# Patient Record
Sex: MALE | Race: WHITE | NOT HISPANIC OR LATINO | Employment: FULL TIME | URBAN - METROPOLITAN AREA
[De-identification: names, ages, dates, MRNs, and addresses within clinical notes are randomized per-mention and may not be internally consistent; named-entity substitution may affect disease eponyms.]

---

## 2017-02-03 RX ORDER — METOPROLOL SUCCINATE 25 MG
TABLET, EXTENDED RELEASE 24 HR ORAL
Qty: 90 TABLET | Refills: 0 | Status: SHIPPED | OUTPATIENT
Start: 2017-02-03 | End: 2017-05-02 | Stop reason: SDUPTHER

## 2017-02-03 RX ORDER — OLMESARTAN/HYDROCHLOROTHIAZIDE 40-12.5 MG
TABLET ORAL
Qty: 90 TABLET | Refills: 0 | Status: SHIPPED | OUTPATIENT
Start: 2017-02-03 | End: 2017-05-02 | Stop reason: SDUPTHER

## 2017-02-03 RX ORDER — ATORVASTATIN CALCIUM 80 MG/1
TABLET, FILM COATED ORAL
Qty: 90 TABLET | Refills: 0 | Status: SHIPPED | OUTPATIENT
Start: 2017-02-03 | End: 2017-03-23

## 2017-02-22 DIAGNOSIS — Z11.59 NEED FOR HEPATITIS C SCREENING TEST: Primary | ICD-10-CM

## 2017-02-24 LAB — HCV AB S/CO SERPL IA: <0.1 S/CO RATIO (ref 0–0.9)

## 2017-02-25 LAB
25(OH)D3+25(OH)D2 SERPL-MCNC: 29 NG/ML (ref 30–100)
ALBUMIN SERPL-MCNC: 4 G/DL (ref 3.5–5.2)
ALBUMIN/GLOB SERPL: 1.5 G/DL
ALP SERPL-CCNC: 53 U/L (ref 39–117)
ALT SERPL-CCNC: 23 U/L (ref 1–41)
APPEARANCE UR: CLEAR
AST SERPL-CCNC: 25 U/L (ref 1–40)
BILIRUB SERPL-MCNC: 0.5 MG/DL (ref 0.1–1.2)
BILIRUB UR QL STRIP: NEGATIVE
BUN SERPL-MCNC: 18 MG/DL (ref 8–23)
BUN/CREAT SERPL: 18.8 (ref 7–25)
CALCIUM SERPL-MCNC: 9.7 MG/DL (ref 8.6–10.5)
CHLORIDE SERPL-SCNC: 101 MMOL/L (ref 98–107)
CHOLEST SERPL-MCNC: 121 MG/DL (ref 100–199)
CK SERPL-CCNC: 137 U/L (ref 20–200)
CO2 SERPL-SCNC: 26.2 MMOL/L (ref 22–29)
COLOR UR: YELLOW
CREAT SERPL-MCNC: 0.96 MG/DL (ref 0.76–1.27)
ERYTHROCYTE [DISTWIDTH] IN BLOOD BY AUTOMATED COUNT: 14 % (ref 11.5–14.5)
GLOBULIN SER CALC-MCNC: 2.6 GM/DL
GLUCOSE SERPL-MCNC: 112 MG/DL (ref 65–99)
GLUCOSE UR QL: NEGATIVE
HBA1C MFR BLD: 5.4 % (ref 4.8–5.6)
HCT VFR BLD AUTO: 41.1 % (ref 40.4–52.2)
HDL SERPL-SCNC: 38.4 UMOL/L
HDLC SERPL-MCNC: 60 MG/DL
HGB BLD-MCNC: 14 G/DL (ref 13.7–17.6)
HGB UR QL STRIP: NEGATIVE
KETONES UR QL STRIP: NEGATIVE
LDL SERPL QN: 20.5 NM
LDL SERPL-SCNC: 632 NMOL/L
LDL SMALL SERPL-SCNC: 347 NMOL/L
LDLC SERPL CALC-MCNC: 51 MG/DL (ref 0–99)
LEUKOCYTE ESTERASE UR QL STRIP: NEGATIVE
MCH RBC QN AUTO: 30.6 PG (ref 27–32.7)
MCHC RBC AUTO-ENTMCNC: 34.1 G/DL (ref 32.6–36.4)
MCV RBC AUTO: 89.9 FL (ref 79.8–96.2)
NITRITE UR QL STRIP: NEGATIVE
PH UR STRIP: 6.5 [PH] (ref 5–8)
PLATELET # BLD AUTO: 206 10*3/MM3 (ref 140–500)
POTASSIUM SERPL-SCNC: 4.7 MMOL/L (ref 3.5–5.2)
PROT SERPL-MCNC: 6.6 G/DL (ref 6–8.5)
PROT UR QL STRIP: NEGATIVE
PSA SERPL-MCNC: 1.34 NG/ML (ref 0–4)
RBC # BLD AUTO: 4.57 10*6/MM3 (ref 4.6–6)
SODIUM SERPL-SCNC: 141 MMOL/L (ref 136–145)
SP GR UR: 1.02 (ref 1–1.03)
TRIGL SERPL-MCNC: 52 MG/DL (ref 0–149)
TSH SERPL DL<=0.005 MIU/L-ACNC: 2.97 UIU/ML (ref 0.45–4.5)
UROBILINOGEN UR STRIP-MCNC: NORMAL MG/DL
WBC # BLD AUTO: 5.68 10*3/MM3 (ref 4.5–10.7)

## 2017-03-02 ENCOUNTER — OFFICE VISIT (OUTPATIENT)
Dept: INTERNAL MEDICINE | Facility: CLINIC | Age: 62
End: 2017-03-02

## 2017-03-02 VITALS
OXYGEN SATURATION: 98 % | DIASTOLIC BLOOD PRESSURE: 84 MMHG | SYSTOLIC BLOOD PRESSURE: 124 MMHG | HEIGHT: 71 IN | WEIGHT: 192 LBS | HEART RATE: 68 BPM | BODY MASS INDEX: 26.88 KG/M2

## 2017-03-02 DIAGNOSIS — E55.9 VITAMIN D DEFICIENCY: Chronic | ICD-10-CM

## 2017-03-02 DIAGNOSIS — Z23 NEED FOR PNEUMOCOCCAL VACCINATION: ICD-10-CM

## 2017-03-02 DIAGNOSIS — R73.01 IMPAIRED FASTING GLUCOSE: Chronic | ICD-10-CM

## 2017-03-02 DIAGNOSIS — E78.5 HYPERLIPIDEMIA, UNSPECIFIED HYPERLIPIDEMIA TYPE: Chronic | ICD-10-CM

## 2017-03-02 DIAGNOSIS — Z23 NEED FOR TDAP VACCINATION: ICD-10-CM

## 2017-03-02 DIAGNOSIS — Z80.0 FAMILY HISTORY OF COLON CANCER: Chronic | ICD-10-CM

## 2017-03-02 DIAGNOSIS — Z00.00 ROUTINE PHYSICAL EXAMINATION: Primary | ICD-10-CM

## 2017-03-02 DIAGNOSIS — I10 BENIGN ESSENTIAL HYPERTENSION: Chronic | ICD-10-CM

## 2017-03-02 PROBLEM — Z92.29 HISTORY OF PNEUMOCOCCAL VACCINATION: Status: RESOLVED | Noted: 2017-03-02 | Resolved: 2017-03-02

## 2017-03-02 PROBLEM — Z92.29 HISTORY OF TETANUS, DIPHTHERIA, AND ACELLULAR PERTUSSIS BOOSTER VACCINATION (TDAP): Status: ACTIVE | Noted: 2017-03-02

## 2017-03-02 PROBLEM — Z92.29 HISTORY OF TETANUS, DIPHTHERIA, AND ACELLULAR PERTUSSIS BOOSTER VACCINATION (TDAP): Status: RESOLVED | Noted: 2017-03-02 | Resolved: 2017-03-02

## 2017-03-02 PROBLEM — Z92.29 HISTORY OF PNEUMOCOCCAL VACCINATION: Status: ACTIVE | Noted: 2017-03-02

## 2017-03-02 PROCEDURE — 99396 PREV VISIT EST AGE 40-64: CPT | Performed by: INTERNAL MEDICINE

## 2017-03-02 PROCEDURE — 90471 IMMUNIZATION ADMIN: CPT | Performed by: INTERNAL MEDICINE

## 2017-03-02 PROCEDURE — 90732 PPSV23 VACC 2 YRS+ SUBQ/IM: CPT | Performed by: INTERNAL MEDICINE

## 2017-03-02 PROCEDURE — 90472 IMMUNIZATION ADMIN EACH ADD: CPT | Performed by: INTERNAL MEDICINE

## 2017-03-02 PROCEDURE — 90715 TDAP VACCINE 7 YRS/> IM: CPT | Performed by: INTERNAL MEDICINE

## 2017-03-02 NOTE — PROGRESS NOTES
03/02/2017    Patient Information  George Chavez                                                                                          [address] [facility] [address]      1955  [phone]      Chief Complaint:     Routine physical examination and follow-up lab work.  No new acute complaints.    History of Present Illness:    Patient with history of hypertension, scoliosis, hyperlipidemia, impaired fasting glucose, family history of colon cancer.  He presents today for a routine annual exam and follow-up lab work.  His past medical history extensively reviewed and updated where necessary including his health maintenance parameters.  This reveals he is up-to-date on his colonoscopy.  He had a colonoscopy last summer that was totally normal.  Although he has a family history of colon cancer this is in his maternal grandfather at the age of 86.  The endoscopist was felt that repeat colonoscopy would be indicated for 10 years.  Review of the records also reveals he needs TDaP, his first pneumococcal vaccination, and Zostavax.  We will give him the pneumococcal vaccination and TDaP today.  He can stop by sometime later this year and received the shingles vaccination.    Review of Systems   Constitution: Negative.   HENT: Negative.    Eyes: Negative.    Cardiovascular: Negative.    Respiratory: Negative.    Endocrine: Negative.    Hematologic/Lymphatic: Negative.    Skin: Negative.    Musculoskeletal: Negative.    Gastrointestinal: Negative.    Genitourinary: Negative.    Neurological: Negative.    Psychiatric/Behavioral: Negative.    Allergic/Immunologic: Negative.        Active Problems:    Patient Active Problem List   Diagnosis   • Benign essential hypertension   • Lumbar scoliosis   • Hyperlipidemia   • Impaired fasting glucose   • Vitamin D deficiency   • Therapeutic drug monitoring   • Routine physical examination   • Family history of colon cancer         Past Medical History    Diagnosis Date   • History of Adhesive capsulitis of right shoulder 05/12/2010     MRI of the right shoulder 05/12/2010 revealed labral tears and evidence of adhesive capsulitis. Patient was evaluated by orthopedist 05/18/2010 and was given a steroid injection and referred to physical therapy. This resulted in improvement of symptoms.   • History of CT scan of abdomen 08/31/2011 08/31/2011 CT of the abdomen and pelvis performed for groin pain was negative.   • History of Holter monitoring 07/12/2010 07/12/2010 ventricular ectopy was 1122, no runs of ventricular tachycardia for ventricular bigeminy. There were 8 events of ventricular trigeminy. Supraventricular ectopy was 51 with no runs or bigeminy.   • History of Impacted cerumen of both ears 02/23/2015 02/23/2015--bilateral cerumen impaction removed with irrigation and curettage. TMs and ear canals are normal. 01/14/2014--bilateral cerumen impaction removed with irrigation and curettage.   • History of MRI Lumbar spine pain 09/23/2011 09/23/2011--MRI lumbar spine revealed mild to moderate degenerative arthritis, dextro scoliosis with the apex centered around L3 and L4   • History of Palpitations 07/12/2010 07/12/2010--24-hour Holter monitor revealed several PVCs and fewer PACs. No significant arrhythmia. Stress echocardiogram 06/06/2010 normal.   • History of Right inguinal pain 09/23/2011     Patient has intermittent right sided groin pain. Urologic evaluation 09/06/2011 was negative. It is suspected that this is referred pain. Possibly from the back, however MRI lumbar spine 09/23/2011 was essentially negative except for some mild to moderate degenerative arthritis and dextro scoliosis.   • History of stress echo 06/06/2002 06/06/2002--normal stress echo         Past Surgical History   Procedure Laterality Date   • Colonoscopy  04/11/2011 04/11/2011--normal colonoscopy.    • Colonoscopy  12/10/2005     12/10/2005--normal  "colonoscopy.   • Laparoscopic cholecystectomy  08/22/2011 08/22/2011--laparoscopic cholecystectomy   • Colonoscopy N/A 7/13/2016 07/13/2016--normal colonoscopy to the cecum with a good prep.         No Known Allergies        Current Outpatient Prescriptions:   •  aspirin 81 MG EC tablet, Take by mouth daily., Disp: , Rfl:   •  atorvastatin (LIPITOR) 80 MG tablet, TAKE 1 TABLET DAILY, Disp: 90 tablet, Rfl: 0  •  BENICAR HCT 40-12.5 MG per tablet, TAKE 1 TABLET DAILY, Disp: 90 tablet, Rfl: 0  •  Cholecalciferol (VITAMIN D-3) 1000 UNITS capsule, Take by mouth., Disp: , Rfl:   •  Coenzyme Q10 300 MG capsule, Take by mouth., Disp: , Rfl:   •  Multiple Vitamin (MULTIVITAMINS PO), Take by mouth daily., Disp: , Rfl:   •  TOPROL XL 25 MG 24 hr tablet, TAKE 1 TABLET DAILY, Disp: 90 tablet, Rfl: 0      Family History   Problem Relation Age of Onset   • Heart attack Mother      Mother had a Myocardial Infarction at age 81. Status post PTCA with stents.   • Heart attack Father      Father had a myocardial infarction at age 60.   • Cancer Maternal Grandfather      MGF was diagnosed with colon cancer at age 86         Social History     Social History   • Marital status:      Spouse name: N/A   • Number of children: N/A   • Years of education: N/A     Occupational History   • Civil       Social History Main Topics   • Smoking status: Former Smoker   • Smokeless tobacco: Never Used      Comment: Stopped smoking at age 41.   • Alcohol use Yes      Comment: Modrerate   • Drug use: No   • Sexual activity: Yes     Partners: Female     Other Topics Concern   • Not on file     Social History Narrative         Vitals:    03/02/17 1547   BP: 124/84   Pulse: 68   SpO2: 98%   Weight: 192 lb (87.1 kg)   Height: 70.5\" (179.1 cm)          Physical Exam:    General: Alert and oriented x 3, with appropriate affect; no acute distress.  HEENT: pupils equal, round, and reactive to light; extraocular movements intact; " sclera nonicteric; nasal mucosa normal; pharynx normal; tympanic membranes and ear canals normal.  Neck: without JVD, thyromegaly, bruit, or adenopathy.  Lungs: clear to auscultation in all fields.  Heart: auscultation reveals regular rate and rhythm without murmur, rub, gallop, or click.  Abdomen: is soft and nontender, without hepato-splenomegaly, mass or hernia. Normal bowel sounds; .  Urologic exam: reveals normal male genitalia without testicular mass or penile/scrotal lesion.  Digital rectal exam and Prostate: deferred.  Extremities: are without clubbing, cyanosis, or edema.  Vascular: no signs of peripheral arterial disease or venous insufficiency/varicosities.  Neurological: intact without focal deficit, including cranial and peripheral nerves.  Station and gait observed to be normal during ingress and egress from the examination area.  Sensation and deep tendon reflexes tested if clinically indicated and are normal.  Musculoskeletal: exam is normal, without signs of synovitis, significant degeneration or deformity. Skin examination: without rash or significant lesions.  Patient has what appears to be an actinic keratosis on his left forearm.  Unfortunately we are out of liquid nitrogen and I will have him come back so that we can destroy lesion.      Lab/other results:    NMR is absolutely perfect.  CMP normal except blood sugar elevated 112.  Urinalysis is normal.  CBC normal.  Hemoglobin A1c 5.4 which is normal.  PSA normal at 1.34.  Vitamin D slightly low at 29.  Thyroid cascade profile normal.  CPK normal.  Hepatitis C antibody is negative.    Assessment/Plan:     Diagnosis Plan   1. Routine physical examination     2. Impaired fasting glucose     3. Hyperlipidemia, unspecified hyperlipidemia type     4. Benign essential hypertension     5. Vitamin D deficiency     6. Family history of colon cancer         Patient presents with essentially normal annual exam except for the following issues: He has very  mild impaired fasting glucose that does not require medication.  Hyperlipidemia is under excellent control and so is his blood pressure.  Vitamin D is a little bit low.  Patient has a family history of colon cancer and had a recent negative colonoscopy.  Repeat study in 10 years.    Addendum: Patient reports he is having muscular aches and pains particularly involving his lower extremities.  He questions whether or not it is related to the generic Lipitor.  He is taking coenzyme Q10 300 mg per day.  I have recommended that he discontinue the Lipitor and we will reassess in a few weeks.      Procedures

## 2017-03-23 ENCOUNTER — OFFICE VISIT (OUTPATIENT)
Dept: INTERNAL MEDICINE | Facility: CLINIC | Age: 62
End: 2017-03-23

## 2017-03-23 VITALS
HEART RATE: 77 BPM | BODY MASS INDEX: 27.16 KG/M2 | HEIGHT: 71 IN | SYSTOLIC BLOOD PRESSURE: 104 MMHG | WEIGHT: 194 LBS | OXYGEN SATURATION: 98 % | DIASTOLIC BLOOD PRESSURE: 68 MMHG

## 2017-03-23 DIAGNOSIS — E78.5 HYPERLIPIDEMIA, UNSPECIFIED HYPERLIPIDEMIA TYPE: Primary | Chronic | ICD-10-CM

## 2017-03-23 DIAGNOSIS — Z23 NEED FOR ZOSTAVAX ADMINISTRATION: ICD-10-CM

## 2017-03-23 DIAGNOSIS — L57.0 MULTIPLE ACTINIC KERATOSES: ICD-10-CM

## 2017-03-23 DIAGNOSIS — H61.23 HEARING LOSS OF BOTH EARS DUE TO CERUMEN IMPACTION: ICD-10-CM

## 2017-03-23 DIAGNOSIS — M79.10 MYALGIA: ICD-10-CM

## 2017-03-23 DIAGNOSIS — H61.23 IMPACTED CERUMEN OF BOTH EARS: ICD-10-CM

## 2017-03-23 PROCEDURE — 90471 IMMUNIZATION ADMIN: CPT | Performed by: INTERNAL MEDICINE

## 2017-03-23 PROCEDURE — 90736 HZV VACCINE LIVE SUBQ: CPT | Performed by: INTERNAL MEDICINE

## 2017-03-23 PROCEDURE — 99214 OFFICE O/P EST MOD 30 MIN: CPT | Performed by: INTERNAL MEDICINE

## 2017-03-23 PROCEDURE — 69210 REMOVE IMPACTED EAR WAX UNI: CPT | Performed by: INTERNAL MEDICINE

## 2017-03-23 PROCEDURE — 17000 DESTRUCT PREMALG LESION: CPT | Performed by: INTERNAL MEDICINE

## 2017-03-23 PROCEDURE — 17003 DESTRUCT PREMALG LES 2-14: CPT | Performed by: INTERNAL MEDICINE

## 2017-03-23 RX ORDER — ROSUVASTATIN CALCIUM 10 MG/1
TABLET, COATED ORAL
Qty: 30 TABLET | Refills: 1 | Status: SHIPPED | OUTPATIENT
Start: 2017-03-23 | End: 2017-05-04

## 2017-03-23 NOTE — PROGRESS NOTES
03/23/2017    Patient Information  George Chavez                                                                                          [address] [facility] [address]      1955  [phone]      Chief Complaint:     Complaining of skin lesions on forearms, decreased hearing and ear discomfort bilaterally, muscular aches and pains and side effects from Lipitor, need for Zostavax.    History of Present Illness:    Patient with a history of hypertension, hyperlipidemia, impaired fasting glucose, family history of colon cancer, recent myalgias believed to be related to Lipitor.  He presents today for several issues that will be described below.  His past medical history extensively reviewed including health maintenance parameters and this reveals he needs Zostavax which we will give today.    The history regarding myalgia secondary to Lipitor:    03/23/2017--patient seen in follow-up and reports his myalgias have essentially resolved after discontinuation of Lipitor.  We will try generic Crestor 10 mg per day.  Reassess with lab in about 6 weeks.    03/02/2017--patient reports muscular aches and pains, particularly lower extremities possibly related to Lipitor, despite taking coenzyme Q 10 300 mg per day.  Lipitor discontinued.    The history regarding forearm skin lesions:    03/23/2017--1 actinic keratosis on the left forearm, 2 actinic keratoses on the right forearm, were destroyed with liquid nitrogen today.      The history regarding hearing loss:    03/23/2017--patient complaining of decreased hearing and discomfort in his ears bilaterally.  Examination reveals bilateral cerumen impactions.  These were removed with irrigation and curettage.    Review of Systems   Constitution: Negative.   HENT: Positive for ear pain and hearing loss.    Eyes: Negative.    Cardiovascular: Negative.    Respiratory: Negative.    Endocrine: Negative.    Hematologic/Lymphatic: Negative.    Skin:  Positive for suspicious lesions.   Musculoskeletal: Positive for muscle cramps and myalgias.   Gastrointestinal: Negative.    Genitourinary: Negative.    Neurological: Negative.    Psychiatric/Behavioral: Negative.    Allergic/Immunologic: Negative.        Active Problems:    Patient Active Problem List   Diagnosis   • Benign essential hypertension   • Lumbar scoliosis   • Hyperlipidemia   • Impaired fasting glucose   • Vitamin D deficiency   • Therapeutic drug monitoring   • Routine physical examination   • Family history of colon cancer   • Myalgia, secondary Lipitor   • Multiple actinic keratoses   • Impacted cerumen of both ears   • Hearing loss of both ears due to cerumen impaction   • Need for Zostavax administration         Past Medical History:   Diagnosis Date   • History of Adhesive capsulitis of right shoulder 05/12/2010    MRI of the right shoulder 05/12/2010 revealed labral tears and evidence of adhesive capsulitis. Patient was evaluated by orthopedist 05/18/2010 and was given a steroid injection and referred to physical therapy. This resulted in improvement of symptoms.   • History of CT scan of abdomen 08/31/2011 08/31/2011 CT of the abdomen and pelvis performed for groin pain was negative.   • History of Holter monitoring 07/12/2010 07/12/2010 ventricular ectopy was 1122, no runs of ventricular tachycardia for ventricular bigeminy. There were 8 events of ventricular trigeminy. Supraventricular ectopy was 51 with no runs or bigeminy.   • History of MRI Lumbar spine pain 09/23/2011 09/23/2011--MRI lumbar spine revealed mild to moderate degenerative arthritis, dextro scoliosis with the apex centered around L3 and L4   • History of Palpitations 07/12/2010 07/12/2010--24-hour Holter monitor revealed several PVCs and fewer PACs. No significant arrhythmia. Stress echocardiogram 06/06/2010 normal.   • History of pneumococcal vaccination 3/2/2017    03/02/2017--PPSV 23 given.  Age 61.  Prevnar 13  when he turns 66.   • History of Right inguinal pain 09/23/2011    Patient has intermittent right sided groin pain. Urologic evaluation 09/06/2011 was negative. It is suspected that this is referred pain. Possibly from the back, however MRI lumbar spine 09/23/2011 was essentially negative except for some mild to moderate degenerative arthritis and dextro scoliosis.   • History of stress echo 06/06/2002 06/06/2002--normal stress echo   • History of tetanus, diphtheria, and acellular pertussis booster vaccination (Tdap) 3/2/2017    03/02/2017--TDaP given.         Past Surgical History:   Procedure Laterality Date   • COLONOSCOPY  04/11/2011 04/11/2011--normal colonoscopy.    • COLONOSCOPY  12/10/2005    12/10/2005--normal colonoscopy.   • COLONOSCOPY N/A 7/13/2016 07/13/2016--normal colonoscopy to the cecum with a good prep.   • LAPAROSCOPIC CHOLECYSTECTOMY  08/22/2011 08/22/2011--laparoscopic cholecystectomy         No Known Allergies        Current Outpatient Prescriptions:   •  aspirin 81 MG EC tablet, Take by mouth daily., Disp: , Rfl:   •  atorvastatin (LIPITOR) 80 MG tablet, TAKE 1 TABLET DAILY, Disp: 90 tablet, Rfl: 0  •  BENICAR HCT 40-12.5 MG per tablet, TAKE 1 TABLET DAILY, Disp: 90 tablet, Rfl: 0  •  Cholecalciferol (VITAMIN D-3) 1000 UNITS capsule, Take by mouth., Disp: , Rfl:   •  Coenzyme Q10 300 MG capsule, Take by mouth., Disp: , Rfl:   •  Multiple Vitamin (MULTIVITAMINS PO), Take by mouth daily., Disp: , Rfl:   •  TOPROL XL 25 MG 24 hr tablet, TAKE 1 TABLET DAILY, Disp: 90 tablet, Rfl: 0      Family History   Problem Relation Age of Onset   • Heart attack Mother      Mother had a Myocardial Infarction at age 81. Status post PTCA with stents.   • Heart attack Father      Father had a myocardial infarction at age 60.   • Cancer Maternal Grandfather      MGF was diagnosed with colon cancer at age 86         Social History     Social History   • Marital status:      Spouse name: N/A   •  "Number of children: N/A   • Years of education: N/A     Occupational History   • Civil       Social History Main Topics   • Smoking status: Former Smoker   • Smokeless tobacco: Never Used      Comment: Stopped smoking at age 41.   • Alcohol use Yes      Comment: Modrerate   • Drug use: No   • Sexual activity: Yes     Partners: Female     Other Topics Concern   • Not on file     Social History Narrative         Vitals:    03/23/17 1344   BP: 104/68   Pulse: 77   SpO2: 98%   Weight: 194 lb (88 kg)   Height: 70.5\" (179.1 cm)          Physical Exam:    General: Alert and oriented x 3.  No acute distress.  Normal affect.  HEENT: Pupils equal, round, reactive to light; extraocular movements intact; sclerae nonicteric; pharynx, ear canals and TMs normal.  Neck: Without JVD, thyromegaly, bruit, or adenopathy.  Lungs: Clear to auscultation in all fields.  Heart: Regular rate and rhythm without murmur, rub, gallop, or click.  Abdomen: Soft, nontender, without hepatosplenomegaly or hernia.  Bowel sounds normal.  : Deferred.  Rectal: Deferred.  Extremities: Without clubbing, cyanosis, edema, or pulse deficit.  Neurologic: Intact without focal deficit.  Normal station and gait observed during ingress and egress from the examination room.  Skin: Multiple actinic keratoses noted on the forearms.  See procedure note.  Musculoskeletal: Unremarkable.      Lab/other results:    Hepatitis C antibody screen is negative.    Assessment/Plan:     Diagnosis Plan   1. Hyperlipidemia, unspecified hyperlipidemia type     2. Myalgia, secondary Lipitor     3. Multiple actinic keratoses     4. Hearing loss of both ears due to cerumen impaction     5. Impacted cerumen of both ears     6. Need for Zostavax administration         Patient with significant hyperlipidemia presents with myalgias secondary to Lipitor.  Her cholesterol is certainly bad enough to warrant treatment with medications.  We will try low-dose generic Crestor.  " Multiple actinic keratoses as described under that diagnosis.  See procedure note.  The decreased hearing and ear discomfort is related to bilateral cerumen impactions.  See procedure note below.    Plan is as follows: Instructions regarding the skin lesions given.  Zostavax given.  Discontinue Lipitor and start Crestor 10 mg per day.  Patient will follow-up in 6 weeks with lab prior to assess the cholesterol status and hopefully his symptoms will remain resolved.  I do recommend continuing coenzyme Q10.          Procedures

## 2017-03-24 ENCOUNTER — RESULTS ENCOUNTER (OUTPATIENT)
Dept: INTERNAL MEDICINE | Facility: CLINIC | Age: 62
End: 2017-03-24

## 2017-03-24 DIAGNOSIS — E78.5 HYPERLIPIDEMIA, UNSPECIFIED HYPERLIPIDEMIA TYPE: Chronic | ICD-10-CM

## 2017-04-28 LAB
ALBUMIN SERPL-MCNC: 3.9 G/DL (ref 3.5–5.2)
ALBUMIN/GLOB SERPL: 1.6 G/DL
ALP SERPL-CCNC: 48 U/L (ref 39–117)
ALT SERPL-CCNC: 16 U/L (ref 1–41)
AST SERPL-CCNC: 23 U/L (ref 1–40)
BILIRUB SERPL-MCNC: 0.3 MG/DL (ref 0.1–1.2)
BUN SERPL-MCNC: 21 MG/DL (ref 8–23)
BUN/CREAT SERPL: 20.4 (ref 7–25)
CALCIUM SERPL-MCNC: 9.4 MG/DL (ref 8.6–10.5)
CHLORIDE SERPL-SCNC: 105 MMOL/L (ref 98–107)
CHOLEST SERPL-MCNC: 130 MG/DL (ref 100–199)
CK SERPL-CCNC: 139 U/L (ref 20–200)
CO2 SERPL-SCNC: 25.5 MMOL/L (ref 22–29)
CREAT SERPL-MCNC: 1.03 MG/DL (ref 0.76–1.27)
GLOBULIN SER CALC-MCNC: 2.5 GM/DL
GLUCOSE SERPL-MCNC: 109 MG/DL (ref 65–99)
HDL SERPL-SCNC: 41.5 UMOL/L
HDLC SERPL-MCNC: 59 MG/DL
LDL SERPL QN: 20 NM
LDL SERPL-SCNC: 728 NMOL/L
LDL SMALL SERPL-SCNC: 398 NMOL/L
LDLC SERPL CALC-MCNC: 60 MG/DL (ref 0–99)
POTASSIUM SERPL-SCNC: 4.3 MMOL/L (ref 3.5–5.2)
PROT SERPL-MCNC: 6.4 G/DL (ref 6–8.5)
SODIUM SERPL-SCNC: 143 MMOL/L (ref 136–145)
TRIGL SERPL-MCNC: 57 MG/DL (ref 0–149)

## 2017-05-02 RX ORDER — ATORVASTATIN CALCIUM 80 MG/1
TABLET, FILM COATED ORAL
Qty: 90 TABLET | OUTPATIENT
Start: 2017-05-02

## 2017-05-02 RX ORDER — OLMESARTAN/HYDROCHLOROTHIAZIDE 40-12.5 MG
TABLET ORAL
Qty: 90 TABLET | Refills: 0 | Status: SHIPPED | OUTPATIENT
Start: 2017-05-02 | End: 2017-07-31 | Stop reason: SDUPTHER

## 2017-05-02 RX ORDER — METOPROLOL SUCCINATE 25 MG
TABLET, EXTENDED RELEASE 24 HR ORAL
Qty: 90 TABLET | Refills: 0 | Status: SHIPPED | OUTPATIENT
Start: 2017-05-02 | End: 2017-07-31 | Stop reason: SDUPTHER

## 2017-05-04 ENCOUNTER — OFFICE VISIT (OUTPATIENT)
Dept: INTERNAL MEDICINE | Facility: CLINIC | Age: 62
End: 2017-05-04

## 2017-05-04 VITALS
HEIGHT: 71 IN | WEIGHT: 191 LBS | HEART RATE: 62 BPM | BODY MASS INDEX: 26.74 KG/M2 | SYSTOLIC BLOOD PRESSURE: 132 MMHG | DIASTOLIC BLOOD PRESSURE: 70 MMHG | OXYGEN SATURATION: 99 %

## 2017-05-04 DIAGNOSIS — M79.10 MYALGIA: Primary | Chronic | ICD-10-CM

## 2017-05-04 DIAGNOSIS — E55.9 VITAMIN D DEFICIENCY: Chronic | ICD-10-CM

## 2017-05-04 DIAGNOSIS — E78.5 HYPERLIPIDEMIA, UNSPECIFIED HYPERLIPIDEMIA TYPE: Chronic | ICD-10-CM

## 2017-05-04 DIAGNOSIS — Z51.81 THERAPEUTIC DRUG MONITORING: ICD-10-CM

## 2017-05-04 DIAGNOSIS — I10 BENIGN ESSENTIAL HYPERTENSION: Chronic | ICD-10-CM

## 2017-05-04 DIAGNOSIS — R73.01 IMPAIRED FASTING GLUCOSE: Chronic | ICD-10-CM

## 2017-05-04 PROBLEM — Z23 NEED FOR ZOSTAVAX ADMINISTRATION: Status: RESOLVED | Noted: 2017-03-23 | Resolved: 2017-05-04

## 2017-05-04 PROBLEM — H61.23 HEARING LOSS OF BOTH EARS DUE TO CERUMEN IMPACTION: Status: RESOLVED | Noted: 2017-03-23 | Resolved: 2017-05-04

## 2017-05-04 PROBLEM — L57.0 MULTIPLE ACTINIC KERATOSES: Chronic | Status: ACTIVE | Noted: 2017-03-23

## 2017-05-04 PROBLEM — H61.23 IMPACTED CERUMEN OF BOTH EARS: Status: RESOLVED | Noted: 2017-03-23 | Resolved: 2017-05-04

## 2017-05-04 PROCEDURE — 99214 OFFICE O/P EST MOD 30 MIN: CPT | Performed by: INTERNAL MEDICINE

## 2017-05-04 RX ORDER — ROSUVASTATIN CALCIUM 5 MG/1
5 TABLET, COATED ORAL DAILY
Qty: 90 TABLET | Refills: 3 | Status: SHIPPED | OUTPATIENT
Start: 2017-05-04 | End: 2017-10-16

## 2017-07-31 RX ORDER — METOPROLOL SUCCINATE 25 MG
TABLET, EXTENDED RELEASE 24 HR ORAL
Qty: 90 TABLET | Refills: 0 | Status: SHIPPED | OUTPATIENT
Start: 2017-07-31 | End: 2017-10-29 | Stop reason: SDUPTHER

## 2017-07-31 RX ORDER — OLMESARTAN MEDOXOMIL AND HYDROCHLOROTHIAZIDE 40/12.5 40; 12.5 MG/1; MG/1
TABLET ORAL
Qty: 90 TABLET | Refills: 0 | Status: SHIPPED | OUTPATIENT
Start: 2017-07-31 | End: 2017-10-29 | Stop reason: SDUPTHER

## 2017-10-04 DIAGNOSIS — E78.5 HYPERLIPIDEMIA, UNSPECIFIED HYPERLIPIDEMIA TYPE: Chronic | ICD-10-CM

## 2017-10-04 DIAGNOSIS — R73.01 IMPAIRED FASTING GLUCOSE: Chronic | ICD-10-CM

## 2017-10-04 DIAGNOSIS — E55.9 VITAMIN D DEFICIENCY: Chronic | ICD-10-CM

## 2017-10-08 LAB
25(OH)D3+25(OH)D2 SERPL-MCNC: 35.5 NG/ML (ref 30–100)
ALBUMIN SERPL-MCNC: 3.9 G/DL (ref 3.5–5.2)
ALBUMIN/GLOB SERPL: 1.4 G/DL
ALP SERPL-CCNC: 55 U/L (ref 39–117)
ALT SERPL-CCNC: 13 U/L (ref 1–41)
AST SERPL-CCNC: 15 U/L (ref 1–40)
BILIRUB SERPL-MCNC: 0.3 MG/DL (ref 0.1–1.2)
BUN SERPL-MCNC: 15 MG/DL (ref 8–23)
BUN/CREAT SERPL: 17.2 (ref 7–25)
CALCIUM SERPL-MCNC: 9.8 MG/DL (ref 8.6–10.5)
CHLORIDE SERPL-SCNC: 104 MMOL/L (ref 98–107)
CHOLEST SERPL-MCNC: 131 MG/DL (ref 100–199)
CK SERPL-CCNC: 85 U/L (ref 20–200)
CO2 SERPL-SCNC: 27.2 MMOL/L (ref 22–29)
CREAT SERPL-MCNC: 0.87 MG/DL (ref 0.76–1.27)
GLOBULIN SER CALC-MCNC: 2.7 GM/DL
GLUCOSE SERPL-MCNC: 111 MG/DL (ref 65–99)
HBA1C MFR BLD: 5.7 % (ref 4.8–5.6)
HDL SERPL-SCNC: 33.5 UMOL/L
HDLC SERPL-MCNC: 49 MG/DL
LDL SERPL QN: 21.2 NM
LDL SERPL-SCNC: 945 NMOL/L
LDL SMALL SERPL-SCNC: 533 NMOL/L
LDLC SERPL CALC-MCNC: 69 MG/DL (ref 0–99)
POTASSIUM SERPL-SCNC: 4.8 MMOL/L (ref 3.5–5.2)
PROT SERPL-MCNC: 6.6 G/DL (ref 6–8.5)
SODIUM SERPL-SCNC: 141 MMOL/L (ref 136–145)
T3FREE SERPL-MCNC: 3.3 PG/ML (ref 2–4.4)
T4 FREE SERPL-MCNC: 1.28 NG/DL (ref 0.93–1.7)
TRIGL SERPL-MCNC: 64 MG/DL (ref 0–149)
TSH SERPL DL<=0.005 MIU/L-ACNC: 3.08 MIU/ML (ref 0.27–4.2)

## 2017-10-16 ENCOUNTER — OFFICE VISIT (OUTPATIENT)
Dept: INTERNAL MEDICINE | Facility: CLINIC | Age: 62
End: 2017-10-16

## 2017-10-16 VITALS
HEIGHT: 71 IN | SYSTOLIC BLOOD PRESSURE: 160 MMHG | BODY MASS INDEX: 26.46 KG/M2 | OXYGEN SATURATION: 98 % | HEART RATE: 88 BPM | DIASTOLIC BLOOD PRESSURE: 94 MMHG | WEIGHT: 189 LBS

## 2017-10-16 DIAGNOSIS — Z51.81 THERAPEUTIC DRUG MONITORING: ICD-10-CM

## 2017-10-16 DIAGNOSIS — I10 BENIGN ESSENTIAL HYPERTENSION: Chronic | ICD-10-CM

## 2017-10-16 DIAGNOSIS — E78.5 HYPERLIPIDEMIA, UNSPECIFIED HYPERLIPIDEMIA TYPE: Chronic | ICD-10-CM

## 2017-10-16 DIAGNOSIS — R73.01 IMPAIRED FASTING GLUCOSE: Primary | Chronic | ICD-10-CM

## 2017-10-16 DIAGNOSIS — M79.10 MYALGIA: Chronic | ICD-10-CM

## 2017-10-16 DIAGNOSIS — E55.9 VITAMIN D DEFICIENCY: Chronic | ICD-10-CM

## 2017-10-16 PROCEDURE — 99214 OFFICE O/P EST MOD 30 MIN: CPT | Performed by: INTERNAL MEDICINE

## 2017-10-16 RX ORDER — EZETIMIBE 10 MG/1
TABLET ORAL
Qty: 30 TABLET | Refills: 3 | Status: SHIPPED | OUTPATIENT
Start: 2017-10-16 | End: 2018-04-09 | Stop reason: SDUPTHER

## 2017-10-16 NOTE — PROGRESS NOTES
10/16/2017    Patient Information  George Chavez                                                                                          [id number] [facility] [address]      1955  [phone]      Chief Complaint:     Follow-up impaired fasting glucose, hyperlipidemia, hypertension, vitamin D deficiency, myalgias secondary to Lipitor.    History of Present Illness:    Patient with a history of medical problems as outlined in chief complaint that have been fairly stable over the past year with the exception of development of bilateral thigh discomfort believed to possibly be related to statin therapy.  This will be described in detail below.  Patient had lab work and is here today to follow-up on his chronic medical issues.  Past medical history reviewed and updated where necessary including health maintenance parameters.  This reveals he needs an influenza vaccination which she plans on getting at the [facility].    The history regarding myalgia related to statin therapy:    10/16/2017--patient seen in follow-up and reports no improvement of his symptoms after reducing the dose of the Crestor.  He is describing discomfort of his anterior lateral thighs bilaterally.  No joint pain and he indicates no muscle pain.  He can run and this does not aggravate the situation.  Surprisingly, his NMR profile is almost perfect on only 5 mg of Crestor.  I think we may actually be dealing with meralgia paresthetica.  The only way to tell for sure if statins are causing or contributing to the issue is discontinue them altogether for a long enough period of time and assessment.  Symptoms did resolve back in March when we discontinued the door.  They returned after starting the Crestor.  We could consider the use of Zetia but first I want to be assured that his symptoms have totally resolved before initiating any new medication.    05/04/2017--patient seen in follow-up and reports return of  myalgias after changing to 10 mg of Crestor daily.  He questions whether or not 5 mg per day might do the job without myalgias.  After reviewing his NMR profile which was excellent, I think this would be a good idea.  He continues to have myalgias related to statin therapy, another consideration would be Zetia.    03/23/2017--patient seen in follow-up and reports his myalgias have essentially resolved after discontinuation of Lipitor.  We will try generic Crestor 10 mg per day.  Reassess with lab in about 6 weeks.    03/02/2017--patient reports muscular aches and pains, particularly lower extremities possibly related to Lipitor, despite taking coenzyme Q 10 300 mg per day.  Lipitor discontinued.    Review of Systems   Constitution: Negative.   HENT: Negative.    Eyes: Negative.    Cardiovascular: Negative.    Respiratory: Negative.    Endocrine: Negative.    Hematologic/Lymphatic: Negative.    Skin: Negative.    Musculoskeletal: Negative.         Bilateral anterior lateral thigh pain   Gastrointestinal: Negative.    Genitourinary: Negative.    Neurological: Negative.    Psychiatric/Behavioral: Negative.    Allergic/Immunologic: Negative.        Active Problems:    Patient Active Problem List   Diagnosis   • Benign essential hypertension   • Lumbar scoliosis   • Hyperlipidemia   • Impaired fasting glucose   • Vitamin D deficiency   • Therapeutic drug monitoring   • Routine physical examination   • Family history of colon cancer   • Myalgia, secondary Lipitor   • Multiple actinic keratoses         Past Medical History:   Diagnosis Date   • Benign essential hypertension 2/15/2016    01/28/2004--treatment for hypertension begun.   • Family history of colon cancer 2/16/2016    Maternal grandfather had colon cancer at age 86.   • History of Adhesive capsulitis of right shoulder 05/12/2010    MRI of the right shoulder 05/12/2010 revealed labral tears and evidence of adhesive capsulitis. Patient was evaluated by orthopedist  05/18/2010 and was given a steroid injection and referred to physical therapy. This resulted in improvement of symptoms.   • History of CT scan of abdomen 08/31/2011 08/31/2011 CT of the abdomen and pelvis performed for groin pain was negative.   • History of Holter monitoring 07/12/2010 07/12/2010 ventricular ectopy was 1122, no runs of ventricular tachycardia for ventricular bigeminy. There were 8 events of ventricular trigeminy. Supraventricular ectopy was 51 with no runs or bigeminy.   • History of Impacted cerumen of both ears 03/23/2017 03/23/2017--patient complaining of decreased hearing and discomfort in his ears bilaterally.  Examination reveals bilateral cerumen impactions.  These were removed with irrigation and curettage.  02/23/2015--bilateral cerumen impaction removed with irrigation and curettage. TMs and ear canals are normal.  01/14/2014--bilateral cerumen impaction removed with irrigation and curettage.   • History of MRI Lumbar spine pain 09/23/2011 09/23/2011--MRI lumbar spine revealed mild to moderate degenerative arthritis, dextro scoliosis with the apex centered around L3 and L4   • History of Palpitations 07/12/2010 07/12/2010--24-hour Holter monitor revealed several PVCs and fewer PACs. No significant arrhythmia. Stress echocardiogram 06/06/2010 normal.   • History of pneumococcal vaccination 3/2/2017    03/02/2017--PPSV 23 given.  Age 61.  Prevnar 13 when he turns 66.   • History of Right inguinal pain 09/23/2011    Patient has intermittent right sided groin pain. Urologic evaluation 09/06/2011 was negative. It is suspected that this is referred pain. Possibly from the back, however MRI lumbar spine 09/23/2011 was essentially negative except for some mild to moderate degenerative arthritis and dextro scoliosis.   • History of stress echo 06/06/2002 06/06/2002--normal stress echo   • History of tetanus, diphtheria, and acellular pertussis booster vaccination (Tdap) 3/2/2017     03/02/2017--TDaP given.   • Hyperlipidemia 2/15/2016    12/27/2010--treatment for hyperlipidemia begun.   • Impaired fasting glucose 2/15/2016    11/01/2007--initial diagnosis.   • Lumbar scoliosis 2/15/2016    09/23/2011--MRI lumbar spine revealed mild to moderate degenerative arthritis, dextro scoliosis with the apex centered around L3 and L4.   • Multiple actinic keratoses 3/23/2017    03/23/2017--1 actinic keratosis on the left forearm, 2 actinic keratoses on the right forearm, were destroyed with liquid nitrogen today.   • Myalgia, secondary Lipitor 3/23/2017    05/04/2017--patient seen in follow-up and reports return of myalgias after changing to 10 mg of Crestor daily.  He questions whether or not 5 mg per day might do the job without myalgias.  After reviewing his NMR profile which was excellent, I think this would be a good idea.  He continues to have myalgias related to statin therapy, another consideration would be Zetia.  03/23/2017--patient seen in follow-up and reports his myalgias have essentially resolved after discontinuation of Lipitor.  We will try generic Crestor 10 mg per day.  Reassess with lab in about 6 weeks.  03/02/2017--patient reports muscular aches and pains, particularly lower extremities possibly related to Lipitor, despite taking coenzyme Q 10 300 mg per day.  Lipitor discontinued.   • Vitamin D deficiency 2/15/2016         Past Surgical History:   Procedure Laterality Date   • COLONOSCOPY  04/11/2011 04/11/2011--normal colonoscopy.    • COLONOSCOPY  12/10/2005    12/10/2005--normal colonoscopy.   • COLONOSCOPY N/A 7/13/2016 07/13/2016--normal colonoscopy to the cecum with a good prep.   • LAPAROSCOPIC CHOLECYSTECTOMY  08/22/2011 08/22/2011--laparoscopic cholecystectomy         No Known Allergies        Current Outpatient Prescriptions:   •  aspirin 81 MG EC tablet, Take by mouth daily., Disp: , Rfl:   •  Cholecalciferol (VITAMIN D-3) 1000 UNITS capsule, Take by mouth.,  "Disp: , Rfl:   •  Coenzyme Q10 300 MG capsule, Take by mouth., Disp: , Rfl:   •  Multiple Vitamin (MULTIVITAMINS PO), Take by mouth daily., Disp: , Rfl:   •  olmesartan-hydrochlorothiazide (BENICAR HCT) 40-12.5 MG per tablet, TAKE 1 TABLET DAILY, Disp: 90 tablet, Rfl: 0  •  rosuvastatin (CRESTOR) 5 MG tablet, Take 1 tablet by mouth Daily., Disp: 90 tablet, Rfl: 3  •  TOPROL XL 25 MG 24 hr tablet, TAKE 1 TABLET DAILY, Disp: 90 tablet, Rfl: 0      Family History   Problem Relation Age of Onset   • Heart attack Mother      Mother had a Myocardial Infarction at age 81. Status post PTCA with stents.   • Heart attack Father      Father had a myocardial infarction at age 60.   • Cancer Maternal Grandfather      MGF was diagnosed with colon cancer at age 86         Social History     Social History   • Marital status:      Spouse name: N/A   • Number of children: N/A   • Years of education: N/A     Occupational History   • Civil       Social History Main Topics   • Smoking status: Former Smoker   • Smokeless tobacco: Never Used      Comment: Stopped smoking at age 41.   • Alcohol use Yes      Comment: Modrerate   • Drug use: No   • Sexual activity: Yes     Partners: Female     Other Topics Concern   • Not on file     Social History Narrative         Vitals:    10/16/17 1417 10/16/17 1444   BP: 150/90 160/94   BP Location:  Right arm   Patient Position:  Sitting   Cuff Size:  Adult   Pulse: 88    SpO2: 98%    Weight: 189 lb (85.7 kg)    Height: 70.5\" (179.1 cm)           Physical Exam:    General: Alert and oriented x 3.  No acute distress.  Normal affect.  HEENT: Pupils equal, round, reactive to light; extraocular movements intact; sclerae nonicteric; pharynx, ear canals and TMs normal.  Neck: Without JVD, thyromegaly, bruit, or adenopathy.  Lungs: Clear to auscultation in all fields.  Heart: Regular rate and rhythm without murmur, rub, gallop, or click.  Abdomen: Soft, nontender, without " hepatosplenomegaly or hernia.  Bowel sounds normal.  : Deferred.  Rectal: Deferred.  Extremities: Without clubbing, cyanosis, edema, or pulse deficit.  Neurologic: Intact without focal deficit.  Normal station and gait observed during ingress and egress from the examination room.  Skin: Without significant lesion.  Musculoskeletal: Unremarkable.      Lab/other results:    NMR is perfect except small LDL particle number very slightly elevated at 533.  CMP normal except blood sugar elevated at 111.  Hemoglobin A1c 5.7.  Thyroid function tests normal.  Vitamin D normal.  CPK normal.    Assessment/Plan:     Diagnosis Plan   1. Impaired fasting glucose     2. Hyperlipidemia, unspecified hyperlipidemia type     3. Benign essential hypertension     4. Vitamin D deficiency     5. Myalgia, secondary Lipitor     6. Therapeutic drug monitoring       Patient has very mild impaired fasting glucose and does not require medication.  Hyperlipidemia is under excellent control even on a small dose of Crestor.  However, patient continues to have anterior thigh discomfort as described.  This discomfort was present previously with generic Lipitor and it resolved with subsequent discontinuation of Lipitor.  It has now returned.  It appears that patient may very well be statin intolerant.    Plan is as follows: Patient should discontinue the Crestor.  I will give him a prescription for Zetia 10 mg per day.  I instructed him to not start the Zetia until his symptoms involving the anterior lateral thighs have totally resolved for 2 weeks.  He should then make a follow-up appointment in about 5 weeks after initiation of the Zetia with lab prior.        Procedures

## 2017-10-30 RX ORDER — METOPROLOL SUCCINATE 25 MG
TABLET, EXTENDED RELEASE 24 HR ORAL
Qty: 90 TABLET | Refills: 1 | Status: SHIPPED | OUTPATIENT
Start: 2017-10-30 | End: 2018-04-28 | Stop reason: SDUPTHER

## 2017-10-30 RX ORDER — OLMESARTAN MEDOXOMIL AND HYDROCHLOROTHIAZIDE 40/12.5 40; 12.5 MG/1; MG/1
TABLET ORAL
Qty: 90 TABLET | Refills: 1 | Status: SHIPPED | OUTPATIENT
Start: 2017-10-30 | End: 2018-04-28 | Stop reason: SDUPTHER

## 2018-01-22 DIAGNOSIS — E78.5 HYPERLIPIDEMIA, UNSPECIFIED HYPERLIPIDEMIA TYPE: Chronic | ICD-10-CM

## 2018-01-22 DIAGNOSIS — R73.01 IMPAIRED FASTING GLUCOSE: Chronic | ICD-10-CM

## 2018-01-25 LAB
ALBUMIN SERPL-MCNC: 4.1 G/DL (ref 3.5–5.2)
ALBUMIN/GLOB SERPL: 1.6 G/DL
ALP SERPL-CCNC: 49 U/L (ref 39–117)
ALT SERPL-CCNC: 16 U/L (ref 1–41)
AST SERPL-CCNC: 20 U/L (ref 1–40)
BILIRUB SERPL-MCNC: 0.6 MG/DL (ref 0.1–1.2)
BUN SERPL-MCNC: 15 MG/DL (ref 8–23)
BUN/CREAT SERPL: 17 (ref 7–25)
CALCIUM SERPL-MCNC: 9.6 MG/DL (ref 8.6–10.5)
CHLORIDE SERPL-SCNC: 101 MMOL/L (ref 98–107)
CHOLEST SERPL-MCNC: 162 MG/DL (ref 100–199)
CK SERPL-CCNC: 111 U/L (ref 20–200)
CO2 SERPL-SCNC: 26.5 MMOL/L (ref 22–29)
CREAT SERPL-MCNC: 0.88 MG/DL (ref 0.76–1.27)
GFR SERPLBLD CREATININE-BSD FMLA CKD-EPI: 107 ML/MIN/1.73
GFR SERPLBLD CREATININE-BSD FMLA CKD-EPI: 88 ML/MIN/1.73
GLOBULIN SER CALC-MCNC: 2.6 GM/DL
GLUCOSE SERPL-MCNC: 106 MG/DL (ref 65–99)
HDL SERPL-SCNC: 37.3 UMOL/L
HDLC SERPL-MCNC: 60 MG/DL
LDL SERPL QN: 21.2 NM
LDL SERPL-SCNC: 964 NMOL/L
LDL SMALL SERPL-SCNC: 446 NMOL/L
LDLC SERPL CALC-MCNC: 84 MG/DL (ref 0–99)
POTASSIUM SERPL-SCNC: 4.4 MMOL/L (ref 3.5–5.2)
PROT SERPL-MCNC: 6.7 G/DL (ref 6–8.5)
SODIUM SERPL-SCNC: 140 MMOL/L (ref 136–145)
TRIGL SERPL-MCNC: 92 MG/DL (ref 0–149)

## 2018-01-30 ENCOUNTER — OFFICE VISIT (OUTPATIENT)
Dept: INTERNAL MEDICINE | Facility: CLINIC | Age: 63
End: 2018-01-30

## 2018-01-30 VITALS
HEART RATE: 70 BPM | HEIGHT: 71 IN | OXYGEN SATURATION: 99 % | BODY MASS INDEX: 26.74 KG/M2 | DIASTOLIC BLOOD PRESSURE: 90 MMHG | WEIGHT: 191 LBS | SYSTOLIC BLOOD PRESSURE: 148 MMHG

## 2018-01-30 DIAGNOSIS — Z78.9 STATIN INTOLERANCE: ICD-10-CM

## 2018-01-30 DIAGNOSIS — E78.5 HYPERLIPIDEMIA, UNSPECIFIED HYPERLIPIDEMIA TYPE: Primary | Chronic | ICD-10-CM

## 2018-01-30 DIAGNOSIS — M79.10 MYALGIA: Chronic | ICD-10-CM

## 2018-01-30 DIAGNOSIS — Z51.81 THERAPEUTIC DRUG MONITORING: ICD-10-CM

## 2018-01-30 DIAGNOSIS — R73.01 IMPAIRED FASTING GLUCOSE: Chronic | ICD-10-CM

## 2018-01-30 DIAGNOSIS — I10 BENIGN ESSENTIAL HYPERTENSION: Chronic | ICD-10-CM

## 2018-01-30 DIAGNOSIS — Z00.00 ROUTINE PHYSICAL EXAMINATION: ICD-10-CM

## 2018-01-30 PROCEDURE — 99214 OFFICE O/P EST MOD 30 MIN: CPT | Performed by: INTERNAL MEDICINE

## 2018-01-30 NOTE — PROGRESS NOTES
01/30/2018    Patient Information  George Chavez                                                                                          [address] [facility] [address]      1955  [phone]      Chief Complaint:     Follow-up hyperlipidemia, recent medication change, statin intolerance, hypertension.  No new acute complaints.    History of Present Illness:    Patient with a history of medical problems as outlined in the chief complaint that have been fairly stable over the past year with the exception of development of statin intolerance which will be described below.  Patient also had somewhat difficult to manage hypertension and we will assess that today as well.  Past medical history reviewed and updated where necessary including health maintenance parameters.  This reveals he is up-to-date.    The history regarding myalgia related to statins is as follows:    01/30/2018--patient seen in follow-up and reports she is tolerating the Zetia well.  His lipid profile is perfect.    10/16/2017--patient seen in follow-up and reports no improvement of his symptoms after reducing the dose of the Crestor.  He is describing discomfort of his anterior lateral thighs bilaterally.  No joint pain and he indicates no muscle pain.  He can run and this does not aggravate the situation.  Surprisingly, his NMR profile is almost perfect on only 5 mg of Crestor.  I think we may actually be dealing with meralgia paresthetica.  The only way to tell for sure if statins are causing or contributing to the issue is discontinue them altogether for a long enough period of time and assessment.  Symptoms did resolve back in March when we discontinued the door.  They returned after starting the Crestor.  We could consider the use of Zetia but first I want to be assured that his symptoms have totally resolved before initiating any new medication.    05/04/2017--patient seen in follow-up and reports return of  myalgias after changing to 10 mg of Crestor daily.  He questions whether or not 5 mg per day might do the job without myalgias.  After reviewing his NMR profile which was excellent, I think this would be a good idea.  He continues to have myalgias related to statin therapy, another consideration would be Zetia.    03/23/2017--patient seen in follow-up and reports his myalgias have essentially resolved after discontinuation of Lipitor.  We will try generic Crestor 10 mg per day.  Reassess with lab in about 6 weeks.    03/02/2017--patient reports muscular aches and pains, particularly lower extremities possibly related to Lipitor, despite taking coenzyme Q 10 300 mg per day.  Lipitor discontinued.    Review of Systems   Constitution: Negative.   HENT: Negative.    Eyes: Negative.    Cardiovascular: Negative.    Respiratory: Negative.    Endocrine: Negative.    Hematologic/Lymphatic: Negative.    Skin: Negative.    Musculoskeletal: Negative.    Gastrointestinal: Negative.    Genitourinary: Negative.    Neurological: Negative.    Psychiatric/Behavioral: Negative.    Allergic/Immunologic: Negative.        Active Problems:    Patient Active Problem List   Diagnosis   • Benign essential hypertension   • Lumbar scoliosis   • Hyperlipidemia   • Impaired fasting glucose   • Vitamin D deficiency   • Therapeutic drug monitoring   • Routine physical examination   • Family history of colon cancer   • Myalgia, secondary Lipitor   • Multiple actinic keratoses         Past Medical History:   Diagnosis Date   • Benign essential hypertension 2/15/2016    01/28/2004--treatment for hypertension begun.   • Family history of colon cancer 2/16/2016    Maternal grandfather had colon cancer at age 86.   • History of Adhesive capsulitis of right shoulder 05/12/2010    MRI of the right shoulder 05/12/2010 revealed labral tears and evidence of adhesive capsulitis. Patient was evaluated by orthopedist 05/18/2010 and was given a steroid injection  and referred to physical therapy. This resulted in improvement of symptoms.   • History of CT scan of abdomen 08/31/2011 08/31/2011 CT of the abdomen and pelvis performed for groin pain was negative.   • History of Holter monitoring 07/12/2010 07/12/2010 ventricular ectopy was 1122, no runs of ventricular tachycardia for ventricular bigeminy. There were 8 events of ventricular trigeminy. Supraventricular ectopy was 51 with no runs or bigeminy.   • History of Impacted cerumen of both ears 03/23/2017 03/23/2017--patient complaining of decreased hearing and discomfort in his ears bilaterally.  Examination reveals bilateral cerumen impactions.  These were removed with irrigation and curettage.  02/23/2015--bilateral cerumen impaction removed with irrigation and curettage. TMs and ear canals are normal.  01/14/2014--bilateral cerumen impaction removed with irrigation and curettage.   • History of MRI Lumbar spine pain 09/23/2011 09/23/2011--MRI lumbar spine revealed mild to moderate degenerative arthritis, dextro scoliosis with the apex centered around L3 and L4   • History of Palpitations 07/12/2010 07/12/2010--24-hour Holter monitor revealed several PVCs and fewer PACs. No significant arrhythmia. Stress echocardiogram 06/06/2010 normal.   • History of pneumococcal vaccination 3/2/2017    03/02/2017--PPSV 23 given.  Age 61.  Prevnar 13 when he turns 66.   • History of Right inguinal pain 09/23/2011    Patient has intermittent right sided groin pain. Urologic evaluation 09/06/2011 was negative. It is suspected that this is referred pain. Possibly from the back, however MRI lumbar spine 09/23/2011 was essentially negative except for some mild to moderate degenerative arthritis and dextro scoliosis.   • History of stress echo 06/06/2002 06/06/2002--normal stress echo   • History of tetanus, diphtheria, and acellular pertussis booster vaccination (Tdap) 3/2/2017    03/02/2017--TDaP given.   •  Hyperlipidemia 2/15/2016    12/27/2010--treatment for hyperlipidemia begun.   • Impaired fasting glucose 2/15/2016    11/01/2007--initial diagnosis.   • Lumbar scoliosis 2/15/2016    09/23/2011--MRI lumbar spine revealed mild to moderate degenerative arthritis, dextro scoliosis with the apex centered around L3 and L4.   • Multiple actinic keratoses 3/23/2017    03/23/2017--1 actinic keratosis on the left forearm, 2 actinic keratoses on the right forearm, were destroyed with liquid nitrogen today.   • Myalgia, secondary Lipitor 3/23/2017    05/04/2017--patient seen in follow-up and reports return of myalgias after changing to 10 mg of Crestor daily.  He questions whether or not 5 mg per day might do the job without myalgias.  After reviewing his NMR profile which was excellent, I think this would be a good idea.  He continues to have myalgias related to statin therapy, another consideration would be Zetia.  03/23/2017--patient seen in follow-up and reports his myalgias have essentially resolved after discontinuation of Lipitor.  We will try generic Crestor 10 mg per day.  Reassess with lab in about 6 weeks.  03/02/2017--patient reports muscular aches and pains, particularly lower extremities possibly related to Lipitor, despite taking coenzyme Q 10 300 mg per day.  Lipitor discontinued.   • Vitamin D deficiency 2/15/2016         Past Surgical History:   Procedure Laterality Date   • COLONOSCOPY  04/11/2011 04/11/2011--normal colonoscopy.    • COLONOSCOPY  12/10/2005    12/10/2005--normal colonoscopy.   • COLONOSCOPY N/A 7/13/2016 07/13/2016--normal colonoscopy to the cecum with a good prep.   • LAPAROSCOPIC CHOLECYSTECTOMY  08/22/2011 08/22/2011--laparoscopic cholecystectomy         No Known Allergies        Current Outpatient Prescriptions:   •  aspirin 81 MG EC tablet, Take by mouth daily., Disp: , Rfl:   •  Cholecalciferol (VITAMIN D-3) 1000 UNITS capsule, Take by mouth., Disp: , Rfl:   •  ezetimibe  "(ZETIA) 10 MG tablet, 1 by mouth daily for cholesterol, Disp: 30 tablet, Rfl: 3  •  Multiple Vitamin (MULTIVITAMINS PO), Take by mouth daily., Disp: , Rfl:   •  olmesartan-hydrochlorothiazide (BENICAR HCT) 40-12.5 MG per tablet, TAKE 1 TABLET DAILY, Disp: 90 tablet, Rfl: 1  •  TOPROL XL 25 MG 24 hr tablet, TAKE 1 TABLET DAILY, Disp: 90 tablet, Rfl: 1      Family History   Problem Relation Age of Onset   • Heart attack Mother      Mother had a Myocardial Infarction at age 81. Status post PTCA with stents.   • Heart attack Father      Father had a myocardial infarction at age 60.   • Cancer Maternal Grandfather      MGF was diagnosed with colon cancer at age 86         Social History     Social History   • Marital status:      Spouse name: N/A   • Number of children: N/A   • Years of education: N/A     Occupational History   • Civil       Social History Main Topics   • Smoking status: Former Smoker   • Smokeless tobacco: Never Used      Comment: Stopped smoking at age 41.   • Alcohol use Yes      Comment: Modrerate   • Drug use: No   • Sexual activity: Yes     Partners: Female     Other Topics Concern   • Not on file     Social History Narrative         Vitals:    01/30/18 1008 01/30/18 1032   BP: 148/96 148/90   BP Location:  Right arm   Patient Position:  Sitting   Cuff Size:  Adult   Pulse: 70    SpO2: 99%    Weight: 86.6 kg (191 lb)    Height: 179.1 cm (70.51\")           Physical Exam:    General: Alert and oriented x 3.  No acute distress.  Normal affect.  HEENT: Pupils equal, round, reactive to light; extraocular movements intact; sclerae nonicteric; pharynx, ear canals and TMs normal.  Neck: Without JVD, thyromegaly, bruit, or adenopathy.  Lungs: Clear to auscultation in all fields.  Heart: Regular rate and rhythm without murmur, rub, gallop, or click.  Abdomen: Soft, nontender, without hepatosplenomegaly or hernia.  Bowel sounds normal.  : Deferred.  Rectal: Deferred.  Extremities: " Without clubbing, cyanosis, edema, or pulse deficit.  Neurologic: Intact without focal deficit.  Normal station and gait observed during ingress and egress from the examination room.  Skin: Without significant lesion.  Musculoskeletal: Unremarkable.      Lab/other results:    NMR is absolutely perfect.  CMP normal except blood sugar elevated 106.  CPK normal.    Assessment/Plan:     Diagnosis Plan   1. Hyperlipidemia, unspecified hyperlipidemia type     2. Benign essential hypertension     3. Impaired fasting glucose     4. Myalgia, secondary Lipitor     5. Therapeutic drug monitoring         Patient has hyperlipidemia that is under excellent control on Zetia alone.  Unfortunately, he has statin intolerance but fortunately that Zetia does well for him.  His blood pressure is up today and he reports his blood pressure seems to be well controlled at home.  He does get anxious when he comes into the office and may have some element of white coat syndrome.    Plan is as follows: Instructed patient to take the Benicar in the morning and take a full metoprolol in the evening.  He is currently taking a half a metoprolol in the morning and taking the Benicar in the evening.  Patient will follow-up in 4 months with lab prior for his annual exam.      Procedures

## 2018-04-09 DIAGNOSIS — E78.5 HYPERLIPIDEMIA, UNSPECIFIED HYPERLIPIDEMIA TYPE: Chronic | ICD-10-CM

## 2018-04-10 RX ORDER — EZETIMIBE 10 MG/1
TABLET ORAL
Qty: 30 TABLET | Refills: 2 | Status: SHIPPED | OUTPATIENT
Start: 2018-04-10 | End: 2018-07-14 | Stop reason: SDUPTHER

## 2018-04-30 RX ORDER — METOPROLOL SUCCINATE 25 MG
TABLET, EXTENDED RELEASE 24 HR ORAL
Qty: 90 TABLET | Refills: 1 | Status: SHIPPED | OUTPATIENT
Start: 2018-04-30 | End: 2018-08-10

## 2018-04-30 RX ORDER — OLMESARTAN MEDOXOMIL AND HYDROCHLOROTHIAZIDE 40/12.5 40; 12.5 MG/1; MG/1
TABLET ORAL
Qty: 90 TABLET | Refills: 1 | Status: SHIPPED | OUTPATIENT
Start: 2018-04-30 | End: 2018-08-10

## 2018-05-21 DIAGNOSIS — E78.5 HYPERLIPIDEMIA, UNSPECIFIED HYPERLIPIDEMIA TYPE: Chronic | ICD-10-CM

## 2018-05-21 DIAGNOSIS — Z00.00 ROUTINE PHYSICAL EXAMINATION: ICD-10-CM

## 2018-05-21 DIAGNOSIS — R73.01 IMPAIRED FASTING GLUCOSE: Chronic | ICD-10-CM

## 2018-05-24 LAB
25(OH)D3+25(OH)D2 SERPL-MCNC: 36.3 NG/ML (ref 30–100)
ALBUMIN SERPL-MCNC: 4.2 G/DL (ref 3.5–5.2)
ALBUMIN/GLOB SERPL: 2 G/DL
ALP SERPL-CCNC: 51 U/L (ref 39–117)
ALT SERPL-CCNC: 15 U/L (ref 1–41)
APPEARANCE UR: CLEAR
AST SERPL-CCNC: 21 U/L (ref 1–40)
BILIRUB SERPL-MCNC: 0.6 MG/DL (ref 0.1–1.2)
BILIRUB UR QL STRIP: NEGATIVE
BUN SERPL-MCNC: 14 MG/DL (ref 8–23)
BUN/CREAT SERPL: 15.4 (ref 7–25)
CALCIUM SERPL-MCNC: 9.2 MG/DL (ref 8.6–10.5)
CHLORIDE SERPL-SCNC: 102 MMOL/L (ref 98–107)
CHOLEST SERPL-MCNC: 161 MG/DL (ref 100–199)
CO2 SERPL-SCNC: 27.2 MMOL/L (ref 22–29)
COLOR UR: YELLOW
CREAT SERPL-MCNC: 0.91 MG/DL (ref 0.76–1.27)
ERYTHROCYTE [DISTWIDTH] IN BLOOD BY AUTOMATED COUNT: 13.8 % (ref 11.5–14.5)
GFR SERPLBLD CREATININE-BSD FMLA CKD-EPI: 103 ML/MIN/1.73
GFR SERPLBLD CREATININE-BSD FMLA CKD-EPI: 85 ML/MIN/1.73
GLOBULIN SER CALC-MCNC: 2.1 GM/DL
GLUCOSE SERPL-MCNC: 104 MG/DL (ref 65–99)
GLUCOSE UR QL: NEGATIVE
HBA1C MFR BLD: 5.7 % (ref 4.8–5.6)
HCT VFR BLD AUTO: 43.5 % (ref 40.4–52.2)
HDL SERPL-SCNC: 38.9 UMOL/L
HDLC SERPL-MCNC: 57 MG/DL
HGB BLD-MCNC: 14.5 G/DL (ref 13.7–17.6)
HGB UR QL STRIP: NEGATIVE
KETONES UR QL STRIP: NEGATIVE
LDL SERPL QN: 20.9 NM
LDL SERPL-SCNC: 1028 NMOL/L
LDL SMALL SERPL-SCNC: 523 NMOL/L
LDLC SERPL CALC-MCNC: 85 MG/DL (ref 0–99)
LEUKOCYTE ESTERASE UR QL STRIP: NEGATIVE
MCH RBC QN AUTO: 30.6 PG (ref 27–32.7)
MCHC RBC AUTO-ENTMCNC: 33.3 G/DL (ref 32.6–36.4)
MCV RBC AUTO: 91.8 FL (ref 79.8–96.2)
NITRITE UR QL STRIP: NEGATIVE
PH UR STRIP: 7.5 [PH] (ref 5–8)
PLATELET # BLD AUTO: 186 10*3/MM3 (ref 140–500)
POTASSIUM SERPL-SCNC: 4.4 MMOL/L (ref 3.5–5.2)
PROT SERPL-MCNC: 6.3 G/DL (ref 6–8.5)
PROT UR QL STRIP: NEGATIVE
PSA SERPL-MCNC: 2.05 NG/ML (ref 0–4)
RBC # BLD AUTO: 4.74 10*6/MM3 (ref 4.6–6)
SODIUM SERPL-SCNC: 142 MMOL/L (ref 136–145)
SP GR UR: 1.02 (ref 1–1.03)
T3FREE SERPL-MCNC: 2.8 PG/ML (ref 2–4.4)
T4 FREE SERPL-MCNC: 1.2 NG/DL (ref 0.93–1.7)
TRIGL SERPL-MCNC: 93 MG/DL (ref 0–149)
TSH SERPL DL<=0.005 MIU/L-ACNC: 3 MIU/ML (ref 0.27–4.2)
UROBILINOGEN UR STRIP-MCNC: NORMAL MG/DL
WBC # BLD AUTO: 5.42 10*3/MM3 (ref 4.5–10.7)

## 2018-05-29 ENCOUNTER — OFFICE VISIT (OUTPATIENT)
Dept: INTERNAL MEDICINE | Facility: CLINIC | Age: 63
End: 2018-05-29

## 2018-05-29 VITALS
BODY MASS INDEX: 27.05 KG/M2 | DIASTOLIC BLOOD PRESSURE: 78 MMHG | WEIGHT: 193.2 LBS | OXYGEN SATURATION: 98 % | SYSTOLIC BLOOD PRESSURE: 140 MMHG | HEART RATE: 83 BPM | HEIGHT: 71 IN

## 2018-05-29 DIAGNOSIS — Z00.00 ROUTINE PHYSICAL EXAMINATION: Primary | ICD-10-CM

## 2018-05-29 DIAGNOSIS — I10 BENIGN ESSENTIAL HYPERTENSION: Chronic | ICD-10-CM

## 2018-05-29 DIAGNOSIS — Z80.0 FAMILY HISTORY OF COLON CANCER: Chronic | ICD-10-CM

## 2018-05-29 DIAGNOSIS — E78.5 HYPERLIPIDEMIA, UNSPECIFIED HYPERLIPIDEMIA TYPE: Chronic | ICD-10-CM

## 2018-05-29 DIAGNOSIS — M79.10 MYALGIA: Chronic | ICD-10-CM

## 2018-05-29 DIAGNOSIS — M75.82 ROTATOR CUFF TENDINITIS, LEFT: ICD-10-CM

## 2018-05-29 DIAGNOSIS — M25.512 CHRONIC LEFT SHOULDER PAIN: ICD-10-CM

## 2018-05-29 DIAGNOSIS — R73.01 IMPAIRED FASTING GLUCOSE: Chronic | ICD-10-CM

## 2018-05-29 DIAGNOSIS — E55.9 VITAMIN D DEFICIENCY: Chronic | ICD-10-CM

## 2018-05-29 DIAGNOSIS — G89.29 CHRONIC LEFT SHOULDER PAIN: ICD-10-CM

## 2018-05-29 DIAGNOSIS — Z51.81 THERAPEUTIC DRUG MONITORING: ICD-10-CM

## 2018-05-29 DIAGNOSIS — Z78.9 STATIN INTOLERANCE: Chronic | ICD-10-CM

## 2018-05-29 PROCEDURE — 99396 PREV VISIT EST AGE 40-64: CPT | Performed by: INTERNAL MEDICINE

## 2018-05-29 PROCEDURE — 99213 OFFICE O/P EST LOW 20 MIN: CPT | Performed by: INTERNAL MEDICINE

## 2018-07-14 DIAGNOSIS — E78.5 HYPERLIPIDEMIA, UNSPECIFIED HYPERLIPIDEMIA TYPE: Chronic | ICD-10-CM

## 2018-07-16 RX ORDER — EZETIMIBE 10 MG/1
TABLET ORAL
Qty: 30 TABLET | Refills: 6 | Status: SHIPPED | OUTPATIENT
Start: 2018-07-16 | End: 2018-08-10

## 2018-07-19 ENCOUNTER — OFFICE VISIT (OUTPATIENT)
Dept: INTERNAL MEDICINE | Facility: CLINIC | Age: 63
End: 2018-07-19

## 2018-07-19 VITALS
DIASTOLIC BLOOD PRESSURE: 80 MMHG | HEIGHT: 70 IN | BODY MASS INDEX: 27.35 KG/M2 | SYSTOLIC BLOOD PRESSURE: 140 MMHG | OXYGEN SATURATION: 99 % | HEART RATE: 66 BPM | WEIGHT: 191 LBS

## 2018-07-19 DIAGNOSIS — M25.512 CHRONIC LEFT SHOULDER PAIN: Primary | ICD-10-CM

## 2018-07-19 DIAGNOSIS — M75.82 ROTATOR CUFF TENDINITIS, LEFT: ICD-10-CM

## 2018-07-19 DIAGNOSIS — R97.20 RISING PSA LEVEL: Primary | ICD-10-CM

## 2018-07-19 DIAGNOSIS — G89.29 CHRONIC LEFT SHOULDER PAIN: Primary | ICD-10-CM

## 2018-07-19 PROCEDURE — 99214 OFFICE O/P EST MOD 30 MIN: CPT | Performed by: INTERNAL MEDICINE

## 2018-07-19 NOTE — PROGRESS NOTES
07/19/2018    Patient Information  George Chavez                                                                                          [id number] [facility] [address]      1955  [phone]      Chief Complaint:     Complaining of continued left shoulder pain.    History of Present Illness:    The history regarding chronic left shoulder pain and suspected rotator cuff tendinitis:    07/19/2018--patient has been going to physical therapy now for nearly 2 months and he reports the strength in his shoulder is better but he continues to have pain and decreased range of motion.  The pain is particularly bad at night.  We will proceed with MRI and probable referral to orthopedics.    05/29/2018--patient presents with approximately 6 week history of nontraumatic left shoulder pain that results in difficulty with sleeping and also decreased range of motion.  He had a previous history of adhesive capsulitis but this was the right shoulder.  Examination reveals good passive range of motion.  I doubt adhesive capsulitis and I suspect he has left rotator cuff tendinitis.  Recommended conservative approach with physical therapy.  The patient does not respond and was to proceed with evaluation including MRI and possible orthopedic referral.    Review of Systems   Constitution: Negative.   HENT: Negative.    Eyes: Negative.    Cardiovascular: Negative.    Respiratory: Negative.    Endocrine: Negative.    Hematologic/Lymphatic: Negative.    Skin: Negative.    Musculoskeletal: Negative.         Chronic left shoulder pain   Gastrointestinal: Negative.    Genitourinary: Negative.    Neurological: Negative.    Psychiatric/Behavioral: Negative.    Allergic/Immunologic: Negative.        Active Problems:    Patient Active Problem List   Diagnosis   • Benign essential hypertension   • Lumbar scoliosis   • Hyperlipidemia   • Impaired fasting glucose   • Vitamin D deficiency   • Therapeutic drug monitoring    • Routine physical examination   • Family history of colon cancer   • Myalgia, secondary Lipitor   • Multiple actinic keratoses   • Statin intolerance   • Chronic left shoulder pain   • Rotator cuff tendinitis, left         Past Medical History:   Diagnosis Date   • Benign essential hypertension 2/15/2016    01/28/2004--treatment for hypertension begun.   • Family history of colon cancer 2/16/2016    Maternal grandfather had colon cancer at age 86.   • History of Palpitations 07/12/2010 07/12/2010--24-hour Holter monitor revealed several PVCs and fewer PACs. No significant arrhythmia. Stress echocardiogram 06/06/2010 normal.   • Hyperlipidemia 2/15/2016    12/27/2010--treatment for hyperlipidemia begun.   • Impaired fasting glucose 2/15/2016    11/01/2007--initial diagnosis.   • Lumbar scoliosis 2/15/2016    09/23/2011--MRI lumbar spine revealed mild to moderate degenerative arthritis, dextro scoliosis with the apex centered around L3 and L4.   • Multiple actinic keratoses 3/23/2017    03/23/2017--1 actinic keratosis on the left forearm, 2 actinic keratoses on the right forearm, were destroyed with liquid nitrogen today.   • Myalgia, secondary Lipitor 3/23/2017    05/04/2017--patient seen in follow-up and reports return of myalgias after changing to 10 mg of Crestor daily.  He questions whether or not 5 mg per day might do the job without myalgias.  After reviewing his NMR profile which was excellent, I think this would be a good idea.  He continues to have myalgias related to statin therapy, another consideration would be Zetia.  03/23/2017--patient seen in follow-up and reports his myalgias have essentially resolved after discontinuation of Lipitor.  We will try generic Crestor 10 mg per day.  Reassess with lab in about 6 weeks.  03/02/2017--patient reports muscular aches and pains, particularly lower extremities possibly related to Lipitor, despite taking coenzyme Q 10 300 mg per day.  Lipitor discontinued.    • Statin intolerance 1/30/2018   • Vitamin D deficiency 2/15/2016         Past Surgical History:   Procedure Laterality Date   • COLONOSCOPY  04/11/2011 04/11/2011--normal colonoscopy.    • COLONOSCOPY  12/10/2005    12/10/2005--normal colonoscopy.   • COLONOSCOPY N/A 7/13/2016 07/13/2016--normal colonoscopy to the cecum with a good prep.   • LAPAROSCOPIC CHOLECYSTECTOMY  08/22/2011 08/22/2011--laparoscopic cholecystectomy         No Known Allergies        Current Outpatient Prescriptions:   •  aspirin 81 MG EC tablet, Take by mouth daily., Disp: , Rfl:   •  Cholecalciferol (VITAMIN D-3) 1000 UNITS capsule, Take by mouth., Disp: , Rfl:   •  ezetimibe (ZETIA) 10 MG tablet, TAKE ONE TABLET BY MOUTH DAILY FOR CHOLESTEROL, Disp: 30 tablet, Rfl: 6  •  Multiple Vitamin (MULTIVITAMINS PO), Take by mouth daily., Disp: , Rfl:   •  olmesartan-hydrochlorothiazide (BENICAR HCT) 40-12.5 MG per tablet, TAKE 1 TABLET DAILY, Disp: 90 tablet, Rfl: 1  •  TOPROL XL 25 MG 24 hr tablet, TAKE 1 TABLET DAILY, Disp: 90 tablet, Rfl: 1      Family History   Problem Relation Age of Onset   • Heart attack Mother         Mother had a Myocardial Infarction at age 81. Status post PTCA with stents.   • Heart attack Father         Father had a myocardial infarction at age 60.   • Cancer Maternal Grandfather         MGF was diagnosed with colon cancer at age 86         Social History     Social History   • Marital status:      Spouse name: N/A   • Number of children: N/A   • Years of education: N/A     Occupational History   • Civil       Social History Main Topics   • Smoking status: Former Smoker   • Smokeless tobacco: Never Used      Comment: Stopped smoking at age 41.   • Alcohol use Yes      Comment: Modrerate   • Drug use: No   • Sexual activity: Yes     Partners: Female     Other Topics Concern   • Not on file     Social History Narrative   • No narrative on file         Vitals:    07/19/18 1107   BP: 140/80  "  Pulse: 66   SpO2: 99%   Weight: 86.6 kg (191 lb)   Height: 179 cm (70.47\")          Physical Exam:    General: Alert and oriented x 3.  No acute distress.  Normal affect.  HEENT: Pupils equal, round, reactive to light; extraocular movements intact; sclerae nonicteric; pharynx, ear canals and TMs normal.  Neck: Without JVD, thyromegaly, bruit, or adenopathy.  Lungs: Clear to auscultation in all fields.  Heart: Regular rate and rhythm without murmur, rub, gallop, or click.  Abdomen: Soft, nontender, without hepatosplenomegaly or hernia.  Bowel sounds normal.  : Deferred.  Rectal: Deferred.  Extremities: Without clubbing, cyanosis, edema, or pulse deficit.  Neurologic: Intact without focal deficit.  Normal station and gait observed during ingress and egress from the examination room.  Skin: Without significant lesion.  Musculoskeletal: Decreased range of motion of the left shoulder.  There is definite abduction weakness.  Drop arm positive.      Lab/other results:      Assessment/Plan:     Diagnosis Plan   1. Chronic left shoulder pain     2. Rotator cuff tendinitis, left       Patient continues to have chronic left shoulder pain and I suspect rotator cuff pathology.  He has really not made much improvement with 2 months of physical therapy and therefore further evaluation is indicated.    Plan is as follows: MRI left shoulder ordered.  Orthopedic referral given.  Samples of Vimovo 500/20, one by mouth twice a day.        Procedures        "

## 2018-07-27 ENCOUNTER — HOSPITAL ENCOUNTER (OUTPATIENT)
Dept: MRI IMAGING | Facility: HOSPITAL | Age: 63
Discharge: HOME | End: 2018-07-27
Admitting: INTERNAL MEDICINE

## 2018-07-27 DIAGNOSIS — G89.29 CHRONIC LEFT SHOULDER PAIN: ICD-10-CM

## 2018-07-27 DIAGNOSIS — M25.512 CHRONIC LEFT SHOULDER PAIN: ICD-10-CM

## 2018-07-27 DIAGNOSIS — M75.82 ROTATOR CUFF TENDINITIS, LEFT: ICD-10-CM

## 2018-07-27 PROCEDURE — 73221 MRI JOINT UPR EXTREM W/O DYE: CPT

## 2018-07-30 DIAGNOSIS — G89.29 CHRONIC LEFT SHOULDER PAIN: ICD-10-CM

## 2018-07-30 DIAGNOSIS — M75.02 ADHESIVE CAPSULITIS OF LEFT SHOULDER: Primary | ICD-10-CM

## 2018-07-30 DIAGNOSIS — M25.512 CHRONIC LEFT SHOULDER PAIN: ICD-10-CM

## 2018-08-10 ENCOUNTER — APPOINTMENT (OUTPATIENT)
Dept: PREADMISSION TESTING | Facility: HOSPITAL | Age: 63
End: 2018-08-10

## 2018-08-10 VITALS
TEMPERATURE: 97.2 F | SYSTOLIC BLOOD PRESSURE: 135 MMHG | WEIGHT: 187.2 LBS | RESPIRATION RATE: 16 BRPM | DIASTOLIC BLOOD PRESSURE: 85 MMHG | BODY MASS INDEX: 26.8 KG/M2 | HEIGHT: 70 IN | OXYGEN SATURATION: 99 % | HEART RATE: 66 BPM

## 2018-08-10 LAB
ALBUMIN SERPL-MCNC: 4.7 G/DL (ref 3.5–5.2)
ALBUMIN/GLOB SERPL: 1.7 G/DL
ALP SERPL-CCNC: 50 U/L (ref 39–117)
ALT SERPL W P-5'-P-CCNC: 21 U/L (ref 1–41)
ANION GAP SERPL CALCULATED.3IONS-SCNC: 12.9 MMOL/L
AST SERPL-CCNC: 20 U/L (ref 1–40)
BASOPHILS # BLD AUTO: 0.03 10*3/MM3 (ref 0–0.2)
BASOPHILS NFR BLD AUTO: 0.4 % (ref 0–1.5)
BILIRUB SERPL-MCNC: 0.4 MG/DL (ref 0.1–1.2)
BUN BLD-MCNC: 18 MG/DL (ref 8–23)
BUN/CREAT SERPL: 20.9 (ref 7–25)
CALCIUM SPEC-SCNC: 9.4 MG/DL (ref 8.6–10.5)
CHLORIDE SERPL-SCNC: 100 MMOL/L (ref 98–107)
CO2 SERPL-SCNC: 24.1 MMOL/L (ref 22–29)
CREAT BLD-MCNC: 0.86 MG/DL (ref 0.76–1.27)
DEPRECATED RDW RBC AUTO: 44.1 FL (ref 37–54)
EOSINOPHIL # BLD AUTO: 0.16 10*3/MM3 (ref 0–0.7)
EOSINOPHIL NFR BLD AUTO: 2.2 % (ref 0.3–6.2)
ERYTHROCYTE [DISTWIDTH] IN BLOOD BY AUTOMATED COUNT: 13.4 % (ref 11.5–14.5)
GFR SERPL CREATININE-BSD FRML MDRD: 90 ML/MIN/1.73
GLOBULIN UR ELPH-MCNC: 2.7 GM/DL
GLUCOSE BLD-MCNC: 96 MG/DL (ref 65–99)
HCT VFR BLD AUTO: 44.4 % (ref 40.4–52.2)
HGB BLD-MCNC: 15 G/DL (ref 13.7–17.6)
IMM GRANULOCYTES # BLD: 0 10*3/MM3 (ref 0–0.03)
IMM GRANULOCYTES NFR BLD: 0 % (ref 0–0.5)
LYMPHOCYTES # BLD AUTO: 1.67 10*3/MM3 (ref 0.9–4.8)
LYMPHOCYTES NFR BLD AUTO: 22.6 % (ref 19.6–45.3)
MCH RBC QN AUTO: 30.4 PG (ref 27–32.7)
MCHC RBC AUTO-ENTMCNC: 33.8 G/DL (ref 32.6–36.4)
MCV RBC AUTO: 89.9 FL (ref 79.8–96.2)
MONOCYTES # BLD AUTO: 0.59 10*3/MM3 (ref 0.2–1.2)
MONOCYTES NFR BLD AUTO: 8 % (ref 5–12)
NEUTROPHILS # BLD AUTO: 4.93 10*3/MM3 (ref 1.9–8.1)
NEUTROPHILS NFR BLD AUTO: 66.8 % (ref 42.7–76)
PLATELET # BLD AUTO: 193 10*3/MM3 (ref 140–500)
PMV BLD AUTO: 9.6 FL (ref 6–12)
POTASSIUM BLD-SCNC: 3.9 MMOL/L (ref 3.5–5.2)
PROT SERPL-MCNC: 7.4 G/DL (ref 6–8.5)
RBC # BLD AUTO: 4.94 10*6/MM3 (ref 4.6–6)
SODIUM BLD-SCNC: 137 MMOL/L (ref 136–145)
WBC NRBC COR # BLD: 7.38 10*3/MM3 (ref 4.5–10.7)

## 2018-08-10 PROCEDURE — 85025 COMPLETE CBC W/AUTO DIFF WBC: CPT | Performed by: ORTHOPAEDIC SURGERY

## 2018-08-10 PROCEDURE — 36415 COLL VENOUS BLD VENIPUNCTURE: CPT

## 2018-08-10 PROCEDURE — 80053 COMPREHEN METABOLIC PANEL: CPT | Performed by: ORTHOPAEDIC SURGERY

## 2018-08-10 PROCEDURE — 93005 ELECTROCARDIOGRAM TRACING: CPT

## 2018-08-10 PROCEDURE — 93010 ELECTROCARDIOGRAM REPORT: CPT | Performed by: INTERNAL MEDICINE

## 2018-08-10 RX ORDER — METOPROLOL SUCCINATE 25 MG/1
25 TABLET, EXTENDED RELEASE ORAL EVERY EVENING
COMMUNITY
End: 2018-10-27 | Stop reason: SDUPTHER

## 2018-08-10 RX ORDER — EZETIMIBE 10 MG/1
10 TABLET ORAL EVERY EVENING
COMMUNITY
End: 2018-10-29 | Stop reason: SDUPTHER

## 2018-08-10 RX ORDER — MULTIVIT-MIN/IRON/FOLIC ACID/K 18-600-40
2000 CAPSULE ORAL DAILY
COMMUNITY

## 2018-08-10 RX ORDER — OLMESARTAN MEDOXOMIL AND HYDROCHLOROTHIAZIDE 40/12.5 40; 12.5 MG/1; MG/1
1 TABLET ORAL DAILY
COMMUNITY
End: 2018-10-27 | Stop reason: SDUPTHER

## 2018-08-10 NOTE — DISCHARGE INSTRUCTIONS
Take the following medications the morning of surgery with a small sip of water:  OLMESARTAN/HCT    THE HOSPITAL WILL CALL YOU THE DAY BEFORE WITH YOUR ARRIVAL TIME.      General Instructions:  • Do not eat solid food after midnight the night before surgery.  • You may drink clear liquids day of surgery but must stop at least one hour before your hospital arrival time.  • It is beneficial for you to have a clear drink that contains carbohydrates the day of surgery.  We suggest a 12 to 20 ounce bottle of Gatorade or Powerade for non-diabetic patients or a 12 to 20 ounce bottle of G2 or Powerade Zero for diabetic patients. (Pediatric patients, are not advised to drink a 12 to 20 ounce carbohydrate drink)    Clear liquids are liquids you can see through.  Nothing red in color.     Plain water                               Sports drinks  Sodas                                   Gelatin (Jell-O)  Fruit juices without pulp such as white grape juice and apple juice  Popsicles that contain no fruit or yogurt  Tea or coffee (no cream or milk added)  Gatorade / Powerade  G2 / Powerade Zero    • Infants may have breast milk up to four hours before surgery.  • Infants drinking formula may drink formula up to six hours before surgery.   • Patients who avoid smoking, chewing tobacco and alcohol for 4 weeks prior to surgery have a reduced risk of post-operative complications.  Quit smoking as many days before surgery as you can.  • Do not smoke, use chewing tobacco or drink alcohol the day of surgery.   • If applicable bring your C-PAP/ BI-PAP machine.  • Bring any papers given to you in the doctor’s office.  • Wear clean comfortable clothes and socks.  • Do not wear contact lenses or make-up.  Bring a case for your glasses.   • Bring crutches or walker if applicable.  • Remove all piercings.  Leave jewelry and any other valuables at home.  • Hair extensions with metal clips must be removed prior to surgery.  • The Pre-Admission  Testing nurse will instruct you to bring medications if unable to obtain an accurate list in Pre-Admission Testing.        If you were given a blood bank ID arm band remember to bring it with you the day of surgery.    Preventing a Surgical Site Infection:  • For 2 to 3 days before surgery, avoid shaving with a razor because the razor can irritate skin and make it easier to develop an infection.    • Any areas of open skin can increase the risk of a post-operative wound infection by allowing bacteria to enter and travel throughout the body.  Notify your surgeon if you have any skin wounds / rashes even if it is not near the expected surgical site.  The area will need assessed to determine if surgery should be delayed until it is healed.  • The night prior to surgery sleep in a clean bed with clean clothing.  Do not allow pets to sleep with you.  • Shower on the morning of surgery using a fresh bar of anti-bacterial soap (such as Dial) and clean washcloth.  Dry with a clean towel and dress in clean clothing.  • Ask your surgeon if you will be receiving antibiotics prior to surgery.  • Make sure you, your family, and all healthcare providers clean their hands with soap and water or an alcohol based hand  before caring for you or your wound.    Day of surgery:  Upon arrival, a Pre-op nurse and Anesthesiologist will review your health history, obtain vital signs, and answer questions you may have.  The only belongings needed at this time will be your home medications and if applicable your C-PAP/BI-PAP machine.  If you are staying overnight your family can leave the rest of your belongings in the car and bring them to your room later.  A Pre-op nurse will start an IV and you may receive medication in preparation for surgery, including something to help you relax.  Your family will be able to see you in the Pre-op area.  While you are in surgery your family should notify the waiting room  if they  leave the waiting room area and provide a contact phone number.    Please be aware that surgery does come with discomfort.  We want to make every effort to control your discomfort so please discuss any uncontrolled symptoms with your nurse.   Your doctor will most likely have prescribed pain medications.      If you are going home after surgery you will receive individualized written care instructions before being discharged.  A responsible adult must drive you to and from the hospital on the day of your surgery and stay with you for 24 hours.    If you are staying overnight following surgery, you will be transported to your hospital room following the recovery period.  [facility] has all private rooms.    You have received a list of surgical assistants for your reference.  If you have any questions please call Pre-Admission Testing at [phone].  Deductibles and co-payments are collected on the day of service. Please be prepared to pay the required co-pay, deductible or deposit on the day of service as defined by your plan.

## 2018-08-16 ENCOUNTER — HOSPITAL ENCOUNTER (OUTPATIENT)
Facility: HOSPITAL | Age: 63
Setting detail: HOSPITAL OUTPATIENT SURGERY
Discharge: HOME | End: 2018-08-16
Attending: ORTHOPAEDIC SURGERY | Admitting: ORTHOPAEDIC SURGERY

## 2018-08-16 ENCOUNTER — ANESTHESIA (OUTPATIENT)
Dept: PERIOP | Facility: HOSPITAL | Age: 63
End: 2018-08-16

## 2018-08-16 ENCOUNTER — ANESTHESIA EVENT (OUTPATIENT)
Dept: PERIOP | Facility: HOSPITAL | Age: 63
End: 2018-08-16

## 2018-08-16 VITALS
SYSTOLIC BLOOD PRESSURE: 118 MMHG | DIASTOLIC BLOOD PRESSURE: 58 MMHG | RESPIRATION RATE: 16 BRPM | OXYGEN SATURATION: 97 % | TEMPERATURE: 98.2 F | HEART RATE: 60 BPM

## 2018-08-16 DIAGNOSIS — M75.02 ADHESIVE CAPSULITIS OF LEFT SHOULDER: Primary | ICD-10-CM

## 2018-08-16 PROCEDURE — 25010000002 TRIAMCINOLONE PER 10 MG: Performed by: ORTHOPAEDIC SURGERY

## 2018-08-16 PROCEDURE — 25010000002 PROPOFOL 10 MG/ML EMULSION: Performed by: ANESTHESIOLOGY

## 2018-08-16 PROCEDURE — 25010000002 MIDAZOLAM PER 1 MG: Performed by: ANESTHESIOLOGY

## 2018-08-16 PROCEDURE — 25010000002 ROPIVACAINE PER 1 MG: Performed by: ANESTHESIOLOGY

## 2018-08-16 PROCEDURE — 25010000002 FENTANYL CITRATE (PF) 100 MCG/2ML SOLUTION: Performed by: ANESTHESIOLOGY

## 2018-08-16 RX ORDER — MIDAZOLAM HYDROCHLORIDE 1 MG/ML
2 INJECTION INTRAMUSCULAR; INTRAVENOUS
Status: DISCONTINUED | OUTPATIENT
Start: 2018-08-16 | End: 2018-08-16 | Stop reason: HOSPADM

## 2018-08-16 RX ORDER — FENTANYL CITRATE 50 UG/ML
100 INJECTION, SOLUTION INTRAMUSCULAR; INTRAVENOUS
Status: DISCONTINUED | OUTPATIENT
Start: 2018-08-16 | End: 2018-08-16 | Stop reason: HOSPADM

## 2018-08-16 RX ORDER — HYDROCODONE BITARTRATE AND ACETAMINOPHEN 7.5; 325 MG/1; MG/1
1 TABLET ORAL ONCE AS NEEDED
Status: DISCONTINUED | OUTPATIENT
Start: 2018-08-16 | End: 2018-08-16 | Stop reason: HOSPADM

## 2018-08-16 RX ORDER — PROMETHAZINE HYDROCHLORIDE 25 MG/1
25 SUPPOSITORY RECTAL ONCE AS NEEDED
Status: DISCONTINUED | OUTPATIENT
Start: 2018-08-16 | End: 2018-08-16 | Stop reason: HOSPADM

## 2018-08-16 RX ORDER — MIDAZOLAM HYDROCHLORIDE 1 MG/ML
1 INJECTION INTRAMUSCULAR; INTRAVENOUS
Status: DISCONTINUED | OUTPATIENT
Start: 2018-08-16 | End: 2018-08-16 | Stop reason: HOSPADM

## 2018-08-16 RX ORDER — PROMETHAZINE HYDROCHLORIDE 25 MG/ML
6.25 INJECTION, SOLUTION INTRAMUSCULAR; INTRAVENOUS ONCE AS NEEDED
Status: DISCONTINUED | OUTPATIENT
Start: 2018-08-16 | End: 2018-08-16 | Stop reason: HOSPADM

## 2018-08-16 RX ORDER — ONDANSETRON 2 MG/ML
4 INJECTION INTRAMUSCULAR; INTRAVENOUS ONCE AS NEEDED
Status: DISCONTINUED | OUTPATIENT
Start: 2018-08-16 | End: 2018-08-16 | Stop reason: HOSPADM

## 2018-08-16 RX ORDER — PROMETHAZINE HYDROCHLORIDE 25 MG/1
25 TABLET ORAL ONCE AS NEEDED
Status: DISCONTINUED | OUTPATIENT
Start: 2018-08-16 | End: 2018-08-16 | Stop reason: HOSPADM

## 2018-08-16 RX ORDER — OXYCODONE HYDROCHLORIDE AND ACETAMINOPHEN 5; 325 MG/1; MG/1
2 TABLET ORAL ONCE AS NEEDED
Status: DISCONTINUED | OUTPATIENT
Start: 2018-08-16 | End: 2018-08-16 | Stop reason: HOSPADM

## 2018-08-16 RX ORDER — LABETALOL HYDROCHLORIDE 5 MG/ML
5 INJECTION, SOLUTION INTRAVENOUS
Status: DISCONTINUED | OUTPATIENT
Start: 2018-08-16 | End: 2018-08-16 | Stop reason: HOSPADM

## 2018-08-16 RX ORDER — FAMOTIDINE 10 MG/ML
20 INJECTION, SOLUTION INTRAVENOUS ONCE
Status: COMPLETED | OUTPATIENT
Start: 2018-08-16 | End: 2018-08-16

## 2018-08-16 RX ORDER — SODIUM CHLORIDE 0.9 % (FLUSH) 0.9 %
1-10 SYRINGE (ML) INJECTION AS NEEDED
Status: DISCONTINUED | OUTPATIENT
Start: 2018-08-16 | End: 2018-08-16 | Stop reason: HOSPADM

## 2018-08-16 RX ORDER — FENTANYL CITRATE 50 UG/ML
50 INJECTION, SOLUTION INTRAMUSCULAR; INTRAVENOUS
Status: DISCONTINUED | OUTPATIENT
Start: 2018-08-16 | End: 2018-08-16 | Stop reason: HOSPADM

## 2018-08-16 RX ORDER — DIPHENHYDRAMINE HYDROCHLORIDE 50 MG/ML
6.25 INJECTION INTRAMUSCULAR; INTRAVENOUS
Status: DISCONTINUED | OUTPATIENT
Start: 2018-08-16 | End: 2018-08-16 | Stop reason: HOSPADM

## 2018-08-16 RX ORDER — EPHEDRINE SULFATE 50 MG/ML
5 INJECTION, SOLUTION INTRAVENOUS ONCE AS NEEDED
Status: DISCONTINUED | OUTPATIENT
Start: 2018-08-16 | End: 2018-08-16 | Stop reason: HOSPADM

## 2018-08-16 RX ORDER — PROPOFOL 10 MG/ML
VIAL (ML) INTRAVENOUS AS NEEDED
Status: DISCONTINUED | OUTPATIENT
Start: 2018-08-16 | End: 2018-08-16 | Stop reason: SURG

## 2018-08-16 RX ORDER — OXYCODONE HYDROCHLORIDE AND ACETAMINOPHEN 5; 325 MG/1; MG/1
1 TABLET ORAL EVERY 4 HOURS PRN
Qty: 40 TABLET | Refills: 0 | Status: SHIPPED | OUTPATIENT
Start: 2018-08-16 | End: 2019-05-31

## 2018-08-16 RX ORDER — FLUMAZENIL 0.1 MG/ML
0.2 INJECTION INTRAVENOUS AS NEEDED
Status: DISCONTINUED | OUTPATIENT
Start: 2018-08-16 | End: 2018-08-16 | Stop reason: HOSPADM

## 2018-08-16 RX ORDER — ROPIVACAINE HYDROCHLORIDE 5 MG/ML
INJECTION, SOLUTION EPIDURAL; INFILTRATION; PERINEURAL AS NEEDED
Status: DISCONTINUED | OUTPATIENT
Start: 2018-08-16 | End: 2018-08-16 | Stop reason: SURG

## 2018-08-16 RX ORDER — LIDOCAINE HYDROCHLORIDE 20 MG/ML
INJECTION, SOLUTION INFILTRATION; PERINEURAL AS NEEDED
Status: DISCONTINUED | OUTPATIENT
Start: 2018-08-16 | End: 2018-08-16 | Stop reason: SURG

## 2018-08-16 RX ORDER — SODIUM CHLORIDE, SODIUM LACTATE, POTASSIUM CHLORIDE, CALCIUM CHLORIDE 600; 310; 30; 20 MG/100ML; MG/100ML; MG/100ML; MG/100ML
9 INJECTION, SOLUTION INTRAVENOUS CONTINUOUS
Status: DISCONTINUED | OUTPATIENT
Start: 2018-08-16 | End: 2018-08-16 | Stop reason: HOSPADM

## 2018-08-16 RX ORDER — LIDOCAINE HYDROCHLORIDE 10 MG/ML
0.5 INJECTION, SOLUTION EPIDURAL; INFILTRATION; INTRACAUDAL; PERINEURAL ONCE AS NEEDED
Status: DISCONTINUED | OUTPATIENT
Start: 2018-08-16 | End: 2018-08-16 | Stop reason: HOSPADM

## 2018-08-16 RX ADMIN — MIDAZOLAM HYDROCHLORIDE 2 MG: 2 INJECTION, SOLUTION INTRAMUSCULAR; INTRAVENOUS at 06:16

## 2018-08-16 RX ADMIN — FAMOTIDINE 20 MG: 10 INJECTION INTRAVENOUS at 06:14

## 2018-08-16 RX ADMIN — FENTANYL CITRATE 50 MCG: 50 INJECTION, SOLUTION INTRAMUSCULAR; INTRAVENOUS at 06:21

## 2018-08-16 RX ADMIN — FENTANYL CITRATE 50 MCG: 50 INJECTION, SOLUTION INTRAMUSCULAR; INTRAVENOUS at 06:15

## 2018-08-16 RX ADMIN — SODIUM CHLORIDE, POTASSIUM CHLORIDE, SODIUM LACTATE AND CALCIUM CHLORIDE: 600; 310; 30; 20 INJECTION, SOLUTION INTRAVENOUS at 07:15

## 2018-08-16 RX ADMIN — PROPOFOL 100 MG: 10 INJECTION, EMULSION INTRAVENOUS at 07:17

## 2018-08-16 RX ADMIN — ROPIVACAINE HYDROCHLORIDE 20 ML: 5 INJECTION, SOLUTION EPIDURAL; INFILTRATION; PERINEURAL at 06:31

## 2018-08-16 RX ADMIN — LIDOCAINE HYDROCHLORIDE 100 MG: 20 INJECTION, SOLUTION INFILTRATION; PERINEURAL at 07:18

## 2018-08-16 RX ADMIN — PROPOFOL 50 MG: 10 INJECTION, EMULSION INTRAVENOUS at 07:18

## 2018-08-16 RX ADMIN — SODIUM CHLORIDE, POTASSIUM CHLORIDE, SODIUM LACTATE AND CALCIUM CHLORIDE 9 ML/HR: 600; 310; 30; 20 INJECTION, SOLUTION INTRAVENOUS at 06:13

## 2018-08-16 NOTE — ANESTHESIA POSTPROCEDURE EVALUATION
Patient: George Chavez    Procedure Summary     Date:  08/16/18 Room / Location:   [facility]  [facility]    Anesthesia Start:  0715 Anesthesia Stop:  0726    Procedure:  LT SHOLDER MANIPULATION (Left ) Diagnosis:      Surgeon:  Mo Lemus MD Provider:  Eleazar Burgos MD    Anesthesia Type:  MAC ASA Status:  2          Anesthesia Type: MAC  Last vitals  BP   99/65 (08/16/18 0730)   Temp   36.8 °C (98.2 °F) (08/16/18 0600)   Pulse   52 (08/16/18 0730)   Resp   16 (08/16/18 0730)     SpO2   96 % (08/16/18 0730)     Post Anesthesia Care and Evaluation    Patient location during evaluation: bedside  Patient participation: complete - patient participated  Level of consciousness: awake  Pain score: 1  Pain management: adequate  Airway patency: patent  Anesthetic complications: No anesthetic complications    Cardiovascular status: acceptable  Respiratory status: acceptable  Hydration status: acceptable    Comments: --------------------            08/16/18 0730     --------------------   BP:       99/65      Pulse:      52       Resp:       16       Temp:                SpO2:      96%      --------------------

## 2018-08-16 NOTE — ANESTHESIA PROCEDURE NOTES
Peripheral Block    Patient location during procedure: pre-op  Start time: 8/16/2018 6:21 AM  Stop time: 8/16/2018 6:31 AM  Reason for block: at surgeon's request and post-op pain management  Performed by  Anesthesiologist: RONAN PALOMINO  Preanesthetic Checklist  Completed: patient identified, site marked, surgical consent, pre-op evaluation, timeout performed, IV checked, risks and benefits discussed and monitors and equipment checked  Prep:  Pt Position: supine  Sterile barriers:cap, gloves, mask and sterile barriers  Prep: ChloraPrep  Patient monitoring: blood pressure monitoring, continuous pulse oximetry and EKG  Procedure  Sedation:yes  Performed under: local infiltration  Guidance:ultrasound guided  ULTRASOUND INTERPRETATION.  Using ultrasound guidance a 22 G gauge needle was placed in close proximity to the brachial plexus nerve, at which point, under ultrasound guidance anesthetic was injected in the area of the nerve and spread of the anesthesia was seen on ultrasound in close proximity thereto.  There were no abnormalities seen on ultrasound; a digital image was taken; and the patient tolerated the procedure with no complications. Images:still images obtained    Laterality:left  Block Type:interscalene  Injection Technique:single-shot  Needle Type:echogenic  Needle Gauge:22 G  Resistance on Injection: less than 15 psi  Medications  Local Injected:ropivacaine 0.5% without epinephrine Local Amount Injected:20mL  Post Assessment  Injection Assessment: negative aspiration for heme, no paresthesia on injection and incremental injection  Patient Tolerance:comfortable throughout block  Complications:no  Additional Notes  rop 20cc / dex 4mg

## 2018-08-16 NOTE — ANESTHESIA PREPROCEDURE EVALUATION
Anesthesia Evaluation     Patient summary reviewed and Nursing notes reviewed   NPO Solid Status: > 8 hours             Airway   Mallampati: II  TM distance: >3 FB  Neck ROM: full  no difficulty expected  Dental - normal exam     Pulmonary - negative pulmonary ROS and normal exam   Cardiovascular - normal exam    (+) hypertension,       Neuro/Psych- negative ROS  GI/Hepatic/Renal/Endo - negative ROS     Musculoskeletal (-) negative ROS    Abdominal  - normal exam   Substance History - negative use     OB/GYN negative ob/gyn ROS         Other                        Anesthesia Plan    ASA 2     MAC   (Isb popc)  intravenous induction   Anesthetic plan and risks discussed with patient.    Plan discussed with CRNA.

## 2018-08-16 NOTE — OP NOTE
ORPROCDYN@  Procedure Note    George Chavez  8/16/2018    Pre-op Diagnosis:   Adhesive capsulitis left shoulder    Post-op Diagnosis:     Adhesive capsulitis left shoulder     Procedure:  Manipulation under anesthesia left shoulder    Surgeon: Mo Lemus M.D.    Surgical Assistant:    Anesthesia: Monitor Anesthesia Care  Anesthesiologist: Eleazar Burgos MD  CRNA: Glenna To CRNA      Staff:   Circulator: Angela Coe RN      Complications: None    IV antibiotic: None     Implants: None    Estimated Blood Loss: none    Procedure: The patient was taken to the operative suite.  After adequate anesthesia was established the left upper extremity was ranged.  He had restricted motion in all planes.  A manipulation was carried out with audible palpable release of adhesions restoring his full motion of the shoulder.  I sterilely prepped anteriorly and injected with half percent Marcaine with epinephrine 3 cc with 40 mg of Kenalog.  This was well tolerated.  He was awoken and taken to the postanesthetic recovery unit in good condition with a sling on the left arm.    Mo Lemus MD     Date: 8/16/2018  Time: 9:14 AM

## 2018-10-29 RX ORDER — EZETIMIBE 10 MG/1
10 TABLET ORAL EVERY EVENING
Qty: 90 TABLET | Refills: 0 | Status: SHIPPED | OUTPATIENT
Start: 2018-10-29 | End: 2019-01-09 | Stop reason: SDUPTHER

## 2018-10-29 RX ORDER — OLMESARTAN MEDOXOMIL AND HYDROCHLOROTHIAZIDE 40/12.5 40; 12.5 MG/1; MG/1
TABLET ORAL
Qty: 90 TABLET | Refills: 0 | Status: SHIPPED | OUTPATIENT
Start: 2018-10-29 | End: 2019-03-05 | Stop reason: SDUPTHER

## 2018-10-29 RX ORDER — METOPROLOL SUCCINATE 25 MG/1
TABLET, EXTENDED RELEASE ORAL
Qty: 90 TABLET | Refills: 0 | Status: SHIPPED | OUTPATIENT
Start: 2018-10-29 | End: 2019-03-05 | Stop reason: SDUPTHER

## 2018-11-09 ENCOUNTER — TELEPHONE (OUTPATIENT)
Dept: INTERNAL MEDICINE | Facility: CLINIC | Age: 63
End: 2018-11-09

## 2018-11-09 NOTE — TELEPHONE ENCOUNTER
I don't see where patient needs to come in for fasting lab.  He had his physical in May and instructions were to follow-up in one year with lab prior for his annual physical.  This should be scheduled after 05/29/2019.  I really do not think he needs any lab work at this time.

## 2018-11-09 NOTE — TELEPHONE ENCOUNTER
Pt is coming in for non-fasting labs Monday. I only see a PSA from July that is active and labs for his CPE for May of next year. Last 2 office visit say nothing about non-fasting labs. Please check what is already under lab tab. I can print out anything there.

## 2018-11-12 LAB — PSA SERPL-MCNC: 2.52 NG/ML (ref 0–4)

## 2019-01-09 RX ORDER — EZETIMIBE 10 MG/1
TABLET ORAL
Qty: 90 TABLET | Refills: 1 | Status: SHIPPED | OUTPATIENT
Start: 2019-01-09 | End: 2019-03-05 | Stop reason: SDUPTHER

## 2019-03-05 RX ORDER — EZETIMIBE 10 MG/1
10 TABLET ORAL EVERY EVENING
Qty: 30 TABLET | Refills: 0 | Status: SHIPPED | OUTPATIENT
Start: 2019-03-05 | End: 2019-07-08 | Stop reason: SDUPTHER

## 2019-03-05 RX ORDER — METOPROLOL SUCCINATE 25 MG/1
25 TABLET, EXTENDED RELEASE ORAL DAILY
Qty: 30 TABLET | Refills: 0 | Status: SHIPPED | OUTPATIENT
Start: 2019-03-05 | End: 2019-06-25 | Stop reason: SDUPTHER

## 2019-03-05 RX ORDER — OLMESARTAN MEDOXOMIL AND HYDROCHLOROTHIAZIDE 40/12.5 40; 12.5 MG/1; MG/1
1 TABLET ORAL DAILY
Qty: 30 TABLET | Refills: 0 | Status: SHIPPED | OUTPATIENT
Start: 2019-03-05 | End: 2019-06-26 | Stop reason: SDUPTHER

## 2019-05-22 DIAGNOSIS — E55.9 VITAMIN D DEFICIENCY: Chronic | ICD-10-CM

## 2019-05-22 DIAGNOSIS — E78.5 HYPERLIPIDEMIA, UNSPECIFIED HYPERLIPIDEMIA TYPE: Chronic | ICD-10-CM

## 2019-05-22 DIAGNOSIS — Z00.00 ROUTINE PHYSICAL EXAMINATION: ICD-10-CM

## 2019-05-22 DIAGNOSIS — R73.01 IMPAIRED FASTING GLUCOSE: Chronic | ICD-10-CM

## 2019-05-24 LAB
25(OH)D3+25(OH)D2 SERPL-MCNC: 32.8 NG/ML (ref 30–100)
ALBUMIN SERPL-MCNC: 3.8 G/DL (ref 3.5–5.2)
ALBUMIN/GLOB SERPL: 1.4 G/DL
ALP SERPL-CCNC: 51 U/L (ref 39–117)
ALT SERPL-CCNC: 16 U/L (ref 1–41)
APPEARANCE UR: CLEAR
AST SERPL-CCNC: 18 U/L (ref 1–40)
BILIRUB SERPL-MCNC: 0.7 MG/DL (ref 0.2–1.2)
BILIRUB UR QL STRIP: NEGATIVE
BUN SERPL-MCNC: 15 MG/DL (ref 8–23)
BUN/CREAT SERPL: 18.1 (ref 7–25)
CALCIUM SERPL-MCNC: 9.7 MG/DL (ref 8.6–10.5)
CHLORIDE SERPL-SCNC: 102 MMOL/L (ref 98–107)
CHOLEST SERPL-MCNC: 179 MG/DL (ref 100–199)
CO2 SERPL-SCNC: 26.2 MMOL/L (ref 22–29)
COLOR UR: YELLOW
CREAT SERPL-MCNC: 0.83 MG/DL (ref 0.76–1.27)
ERYTHROCYTE [DISTWIDTH] IN BLOOD BY AUTOMATED COUNT: 13.6 % (ref 12.3–15.4)
GLOBULIN SER CALC-MCNC: 2.7 GM/DL
GLUCOSE SERPL-MCNC: 108 MG/DL (ref 65–99)
GLUCOSE UR QL: NEGATIVE
HBA1C MFR BLD: 5.5 % (ref 4.8–5.6)
HCT VFR BLD AUTO: 44.1 % (ref 37.5–51)
HDL SERPL-SCNC: 40.9 UMOL/L
HDLC SERPL-MCNC: 69 MG/DL
HGB BLD-MCNC: 14.5 G/DL (ref 13–17.7)
HGB UR QL STRIP: NEGATIVE
KETONES UR QL STRIP: NEGATIVE
LDL SERPL QN: 20.7 NM
LDL SERPL-SCNC: 1140 NMOL/L
LDL SMALL SERPL-SCNC: 496 NMOL/L
LDLC SERPL CALC-MCNC: 93 MG/DL (ref 0–99)
LEUKOCYTE ESTERASE UR QL STRIP: NEGATIVE
MCH RBC QN AUTO: 31.2 PG (ref 26.6–33)
MCHC RBC AUTO-ENTMCNC: 32.9 G/DL (ref 31.5–35.7)
MCV RBC AUTO: 94.8 FL (ref 79–97)
NITRITE UR QL STRIP: NEGATIVE
PH UR STRIP: 7 [PH] (ref 5–8)
PLATELET # BLD AUTO: 201 10*3/MM3 (ref 140–450)
POTASSIUM SERPL-SCNC: 4.2 MMOL/L (ref 3.5–5.2)
PROT SERPL-MCNC: 6.5 G/DL (ref 6–8.5)
PROT UR QL STRIP: NEGATIVE
PSA SERPL-MCNC: 1.97 NG/ML (ref 0–4)
RBC # BLD AUTO: 4.65 10*6/MM3 (ref 4.14–5.8)
SODIUM SERPL-SCNC: 140 MMOL/L (ref 136–145)
SP GR UR: 1.03 (ref 1–1.03)
T3FREE SERPL-MCNC: 3.3 PG/ML (ref 2–4.4)
T4 FREE SERPL-MCNC: 1.26 NG/DL (ref 0.93–1.7)
TRIGL SERPL-MCNC: 86 MG/DL (ref 0–149)
TSH SERPL DL<=0.005 MIU/L-ACNC: 3.69 MIU/ML (ref 0.27–4.2)
UROBILINOGEN UR STRIP-MCNC: NORMAL MG/DL
WBC # BLD AUTO: 5.93 10*3/MM3 (ref 3.4–10.8)

## 2019-05-31 ENCOUNTER — OFFICE VISIT (OUTPATIENT)
Dept: INTERNAL MEDICINE | Facility: CLINIC | Age: 64
End: 2019-05-31

## 2019-05-31 VITALS
WEIGHT: 197.6 LBS | HEIGHT: 70 IN | HEART RATE: 78 BPM | SYSTOLIC BLOOD PRESSURE: 164 MMHG | DIASTOLIC BLOOD PRESSURE: 90 MMHG | OXYGEN SATURATION: 98 % | BODY MASS INDEX: 28.29 KG/M2

## 2019-05-31 DIAGNOSIS — R97.20 RISING PSA LEVEL: ICD-10-CM

## 2019-05-31 DIAGNOSIS — M79.10 MYALGIA: Chronic | ICD-10-CM

## 2019-05-31 DIAGNOSIS — E55.9 VITAMIN D DEFICIENCY: Chronic | ICD-10-CM

## 2019-05-31 DIAGNOSIS — Z00.00 ROUTINE PHYSICAL EXAMINATION: Primary | ICD-10-CM

## 2019-05-31 DIAGNOSIS — M25.512 CHRONIC LEFT SHOULDER PAIN: ICD-10-CM

## 2019-05-31 DIAGNOSIS — G89.29 CHRONIC LEFT SHOULDER PAIN: ICD-10-CM

## 2019-05-31 DIAGNOSIS — I10 BENIGN ESSENTIAL HYPERTENSION: Chronic | ICD-10-CM

## 2019-05-31 DIAGNOSIS — M75.02 ADHESIVE CAPSULITIS OF LEFT SHOULDER: ICD-10-CM

## 2019-05-31 DIAGNOSIS — Z78.9 STATIN INTOLERANCE: Chronic | ICD-10-CM

## 2019-05-31 DIAGNOSIS — Z51.81 THERAPEUTIC DRUG MONITORING: ICD-10-CM

## 2019-05-31 DIAGNOSIS — R73.01 IMPAIRED FASTING GLUCOSE: Chronic | ICD-10-CM

## 2019-05-31 DIAGNOSIS — Z80.0 FAMILY HISTORY OF COLON CANCER: Chronic | ICD-10-CM

## 2019-05-31 DIAGNOSIS — E78.5 HYPERLIPIDEMIA, UNSPECIFIED HYPERLIPIDEMIA TYPE: Chronic | ICD-10-CM

## 2019-05-31 PROCEDURE — 99396 PREV VISIT EST AGE 40-64: CPT | Performed by: INTERNAL MEDICINE

## 2019-05-31 NOTE — PROGRESS NOTES
05/31/2019    Patient Information  George Chavez                                                                                          [id number] [facility] [address]      1955  [unfilled]  There is no work phone number on file.    Chief Complaint:     Routine annual physical examination and follow-up lab work.    History of Present Illness:    Patient with a history of impaired fasting glucose, hypertension, hypertension which has been somewhat labile, family history of colon cancer, history of statin intolerance and myalgias secondary to Lipitor.  He presents today for his routine annual exam and follow-up lab work.  Patient has a history of hypertension which has been somewhat labile but his blood pressure readings at home have not been significantly elevated, certainly is not as high as they are today.  Patient feels fairly well except he is having some myalgia of his thighs bilaterally and occasional pain referred into the inguinal region on the right.  Previous evaluation has been negative.  We did think the myalgias were likely related to Lipitor but that is not definite, certainly given the fact that his symptoms have recurred even though he is been off Lipitor for some time.  Z he is not known to cause musculoskeletal complaints.  His past medical history reviewed and updated were necessary including health maintenance parameters.  This reveals he is up-to-date or else accounted for with exception of the new shingles vaccine.  I advised patient to check with his insurance company to see if it is covered here in the office and if so he can obtain it here as we currently have it in stock.  He can also get it at the drugstore but they have been out of stock and on back order at most pharmacies.    Review of Systems   Constitution: Negative.   HENT: Negative.    Eyes: Negative.    Cardiovascular: Negative.    Respiratory: Negative.    Endocrine: Negative.     Hematologic/Lymphatic: Negative.    Skin: Negative.    Musculoskeletal: Positive for arthritis and myalgias.   Gastrointestinal: Negative.    Genitourinary: Negative.    Neurological: Negative.    Psychiatric/Behavioral: Negative.    Allergic/Immunologic: Negative.        Active Problems:    Patient Active Problem List   Diagnosis   • Benign essential hypertension   • Lumbar scoliosis   • Hyperlipidemia   • Impaired fasting glucose   • Vitamin D deficiency   • Therapeutic drug monitoring   • Routine physical examination   • Family history of colon cancer   • Myalgia, secondary Lipitor   • Multiple actinic keratoses   • Statin intolerance   • Rising PSA level         Past Medical History:   Diagnosis Date   • Benign essential hypertension 2/15/2016    01/28/2004--treatment for hypertension begun.   • Family history of colon cancer 2/16/2016    Maternal grandfather had colon cancer at age 86.   • History of Palpitations 07/12/2010 07/12/2010--24-hour Holter monitor revealed several PVCs and fewer PACs. No significant arrhythmia. Stress echocardiogram 06/06/2010 normal.   • Hyperlipidemia 2/15/2016    12/27/2010--treatment for hyperlipidemia begun.   • Impaired fasting glucose 2/15/2016    11/01/2007--initial diagnosis.   • Lumbar scoliosis 2/15/2016    09/23/2011--MRI lumbar spine revealed mild to moderate degenerative arthritis, dextro scoliosis with the apex centered around L3 and L4.   • Multiple actinic keratoses 3/23/2017    03/23/2017--1 actinic keratosis on the left forearm, 2 actinic keratoses on the right forearm, were destroyed with liquid nitrogen today.   • Myalgia, secondary Lipitor 3/23/2017    05/04/2017--patient seen in follow-up and reports return of myalgias after changing to 10 mg of Crestor daily.  He questions whether or not 5 mg per day might do the job without myalgias.  After reviewing his NMR profile which was excellent, I think this would be a good idea.  He continues to have myalgias  related to statin therapy, another consideration would be Zetia.  03/23/2017--patient seen in follow-up and reports his myalgias have essentially resolved after discontinuation of Lipitor.  We will try generic Crestor 10 mg per day.  Reassess with lab in about 6 weeks.  03/02/2017--patient reports muscular aches and pains, particularly lower extremities possibly related to Lipitor, despite taking coenzyme Q 10 300 mg per day.  Lipitor discontinued.   • Statin intolerance 1/30/2018   • Vitamin D deficiency 2/15/2016         Past Surgical History:   Procedure Laterality Date   • COLONOSCOPY  04/11/2011 04/11/2011--normal colonoscopy.    • COLONOSCOPY  12/10/2005    12/10/2005--normal colonoscopy.   • COLONOSCOPY N/A 7/13/2016 07/13/2016--normal colonoscopy to the cecum with a good prep.   • JOINT MANIPULATION Left 8/16/2018    Procedure: LT SHOLDER MANIPULATION;  Surgeon: Mo Lemus MD;  [place] [facility] [place] [facility] [place]  Service: Orthopedics   • LAPAROSCOPIC CHOLECYSTECTOMY  08/22/2011 08/22/2011--laparoscopic cholecystectomy         No Known Allergies        Current Outpatient Medications:   •  aspirin 81 MG EC tablet, Take 81 mg by mouth Daily., Disp: , Rfl:   •  Cholecalciferol (VITAMIN D) 2000 units capsule, Take 2,000 Units by mouth Daily., Disp: , Rfl:   •  ezetimibe (ZETIA) 10 MG tablet, Take 1 tablet by mouth Every Evening., Disp: 30 tablet, Rfl: 0  •  metoprolol succinate XL (TOPROL-XL) 25 MG 24 hr tablet, Take 1 tablet by mouth Daily., Disp: 30 tablet, Rfl: 0  •  Multiple Vitamin (MULTIVITAMINS PO), Take by mouth daily., Disp: , Rfl:   •  olmesartan-hydrochlorothiazide (BENICAR HCT) 40-12.5 MG per tablet, Take 1 tablet by mouth Daily., Disp: 30 tablet, Rfl: 0      Family History   Problem Relation Age of Onset   • Heart attack Mother         Mother had a Myocardial Infarction at age 81. Status post PTCA with stents.   • Heart attack Father         Father had a myocardial infarction at age 60.   •  "Cancer Maternal Grandfather         MGF was diagnosed with colon cancer at age 86   • Malig Hyperthermia Neg Hx          Social History     Socioeconomic History   • Marital status:      Spouse name: Not on file   • Number of children: Not on file   • Years of education: Not on file   • Highest education level: Not on file   Occupational History   • Occupation: Civil    Social Needs   • Financial resource strain: Not hard at all   • Food insecurity:     Worry: Never true     Inability: Never true   • Transportation needs:     Medical: No     Non-medical: No   Tobacco Use   • Smoking status: Former Smoker     Packs/day: 0.50     Years: 18.00     Pack years: 9.00     Types: Cigarettes   • Smokeless tobacco: Never Used   • Tobacco comment: Stopped smoking at age 41.   Substance and Sexual Activity   • Alcohol use: Yes     Frequency: 2-4 times a month     Drinks per session: 1 or 2     Binge frequency: Never     Comment: SOCIALLY   • Drug use: No   • Sexual activity: Yes     Partners: Female   Lifestyle   • Physical activity:     Days per week: 3 days     Minutes per session: 20 min   • Stress: To some extent   Relationships   • Social connections:     Talks on phone: Patient refused     Gets together: Patient refused     Attends Zoroastrianism service: Patient refused     Active member of club or organization: Patient refused     Attends meetings of clubs or organizations: Patient refused     Relationship status: Patient refused         Vitals:    05/31/19 1301   BP: 164/90   BP Location: Right arm   Patient Position: Sitting   Cuff Size: Adult   Pulse: 78   SpO2: 98%   Weight: 89.6 kg (197 lb 9.6 oz)   Height: 177.8 cm (70\")          Physical Exam:    General: Alert and oriented x 3, with appropriate affect; no acute distress.  HEENT: pupils equal, round, and reactive to light; extraocular movements intact; sclera nonicteric; nasal mucosa normal; pharynx normal; tympanic membranes and ear canals " normal.  Neck: without JVD, thyromegaly, bruit, or adenopathy.  Lungs: clear to auscultation in all fields.  Heart: auscultation reveals regular rate and rhythm without murmur, rub, gallop, or click.  Abdomen: is soft and nontender, without hepato-splenomegaly, mass or hernia. Normal bowel sounds; .  Urologic exam: reveals normal male genitalia without testicular mass or penile/scrotal lesion.  Digital rectal exam and Prostate: deferred.  Extremities: are without clubbing, cyanosis, or edema.  Vascular: no signs of peripheral arterial disease or venous insufficiency/varicosities.  Neurological: intact without focal deficit, including cranial and peripheral nerves.  Station and gait observed to be normal during ingress and egress from the examination area.  Sensation and deep tendon reflexes tested if clinically indicated and are normal.  Musculoskeletal: exam is normal, without signs of synovitis, significant degeneration or deformity. Skin examination: without rash or significant lesions.    Lab/other results:    NMR is normal other than LDL particle number only slightly elevated at 1140.  Small LDL particle number is excellent at 496 and his HDL particle numbers pretty good at 40.9.  CMP normal except blood sugar 108.  Urinalysis normal.  CBC normal.  Hemoglobin A1c normal at 5.5.  Thyroid function tests are normal.  PSA is normal at 1.97.  Vitamin D normal.    Assessment/Plan:     Diagnosis Plan   1. Routine physical examination     2. Impaired fasting glucose     3. Hyperlipidemia, unspecified hyperlipidemia type     4. Benign essential hypertension     5. Vitamin D deficiency     6. Family history of colon cancer     7. Myalgia, secondary Lipitor     8. Statin intolerance     9. Chronic left shoulder pain     10. Adhesive capsulitis of left shoulder     11. Rising PSA level     12. Therapeutic drug monitoring       Patient presents with essentially normal annual except for the following issues: He has very mild  impaired fasting glucose that does not require medication.  His hemoglobin A1c remains normal.  Hyperlipidemia is under good control with his Zetia.  Patient previously had what we thought was statin intolerance, particularly the Lipitor, but he continues to have some myalgias and therefore the diagnosis is somewhat suspect.  His symptoms are not severe and I think observation is indicated at the present time.  His blood pressure is elevated today and enough that medication adjustment is warranted.  Patient does have a family history of colon cancer and he is up-to-date on his colonoscopy.  Patient had a slightly rising PSA recently and his PSA is now more back down the baseline.  We will continue to monitor.    Plan is as follows: I was initially going to add HCTZ 12.5 mg/day to his Benicar HCT 40/12.5 but patient would rather trial some lifestyle changes and I certainly concur with that.  I think he needs to lose 15 or perhaps 20 pounds and that may have a profound effect on the blood pressure.  I do not think his blood pressure is dangerously high and I see no reason why we cannot try weight loss first before we add more medication.  In regards to the cholesterol, I think it is fine just to stay on the Zetia.  We will not go to formally address the myalgias as they are not severe at the present time.  I will have patient follow-up in about 2 months to reassess the blood pressure and the weight loss.        Procedures

## 2019-06-26 RX ORDER — METOPROLOL SUCCINATE 25 MG
TABLET, EXTENDED RELEASE 24 HR ORAL
Qty: 90 TABLET | Refills: 0 | Status: SHIPPED | OUTPATIENT
Start: 2019-06-26 | End: 2019-09-24 | Stop reason: SDUPTHER

## 2019-06-26 RX ORDER — OLMESARTAN MEDOXOMIL AND HYDROCHLOROTHIAZIDE 40/12.5 40; 12.5 MG/1; MG/1
TABLET ORAL
Qty: 90 TABLET | Refills: 0 | Status: SHIPPED | OUTPATIENT
Start: 2019-06-26 | End: 2019-09-24 | Stop reason: SDUPTHER

## 2019-07-08 RX ORDER — EZETIMIBE 10 MG/1
TABLET ORAL
Qty: 90 TABLET | Refills: 1 | Status: SHIPPED | OUTPATIENT
Start: 2019-07-08 | End: 2020-01-06

## 2019-07-30 ENCOUNTER — OFFICE VISIT (OUTPATIENT)
Dept: INTERNAL MEDICINE | Facility: CLINIC | Age: 64
End: 2019-07-30

## 2019-07-30 VITALS
OXYGEN SATURATION: 99 % | BODY MASS INDEX: 26.83 KG/M2 | DIASTOLIC BLOOD PRESSURE: 80 MMHG | SYSTOLIC BLOOD PRESSURE: 156 MMHG | HEIGHT: 70 IN | HEART RATE: 77 BPM | WEIGHT: 187.4 LBS

## 2019-07-30 DIAGNOSIS — E78.5 HYPERLIPIDEMIA, UNSPECIFIED HYPERLIPIDEMIA TYPE: Chronic | ICD-10-CM

## 2019-07-30 DIAGNOSIS — M79.10 MYALGIA: Chronic | ICD-10-CM

## 2019-07-30 DIAGNOSIS — E66.9 NON MORBID OBESITY: ICD-10-CM

## 2019-07-30 DIAGNOSIS — Z78.9 STATIN INTOLERANCE: Chronic | ICD-10-CM

## 2019-07-30 DIAGNOSIS — Z00.00 ROUTINE PHYSICAL EXAMINATION: ICD-10-CM

## 2019-07-30 DIAGNOSIS — R97.20 RISING PSA LEVEL: ICD-10-CM

## 2019-07-30 DIAGNOSIS — Z51.81 THERAPEUTIC DRUG MONITORING: ICD-10-CM

## 2019-07-30 DIAGNOSIS — R73.01 IMPAIRED FASTING GLUCOSE: Chronic | ICD-10-CM

## 2019-07-30 DIAGNOSIS — E55.9 VITAMIN D DEFICIENCY: Chronic | ICD-10-CM

## 2019-07-30 DIAGNOSIS — I10 BENIGN ESSENTIAL HYPERTENSION: Primary | Chronic | ICD-10-CM

## 2019-07-30 PROCEDURE — 99213 OFFICE O/P EST LOW 20 MIN: CPT | Performed by: INTERNAL MEDICINE

## 2019-07-30 NOTE — PROGRESS NOTES
07/30/2019    Patient Information  George Chavez                                                                                          [id number] [facility] [address]      1955  [unfilled]  There is no work phone number on file.    Chief Complaint:     Follow-up poorly controlled hypertension, recent weight gain and nonmorbid obesity.    History of Present Illness:    Patient with a history of hypertension that was not optimally controlled 2 months ago at his physical.  Patient also has hyperlipidemia, impaired fasting glucose, statin intolerance with myalgias due to Lipitor.  He presents today to follow-up on his blood pressure after working on diet and lifestyle changes.  Past medical history reviewed and updated were necessary including health maintenance parameters.  This reveals he is up-to-date or else accounted for except for the new shingles vaccine.  I encouraged patient to check with his insurance to see if this is covered here in the office.    Review of Systems   Constitution: Negative.   HENT: Negative.    Eyes: Negative.    Cardiovascular: Negative.    Respiratory: Negative.    Endocrine: Negative.    Hematologic/Lymphatic: Negative.    Skin: Negative.    Musculoskeletal: Negative.    Gastrointestinal: Negative.    Genitourinary: Negative.    Neurological: Negative.    Psychiatric/Behavioral: Negative.    Allergic/Immunologic: Negative.        Active Problems:    Patient Active Problem List   Diagnosis   • Benign essential hypertension   • Lumbar scoliosis   • Hyperlipidemia   • Impaired fasting glucose   • Vitamin D deficiency   • Therapeutic drug monitoring   • Routine physical examination   • Family history of colon cancer   • Myalgia, secondary Lipitor   • Multiple actinic keratoses   • Statin intolerance   • Rising PSA level   • Non morbid obesity         Past Medical History:   Diagnosis Date   • Benign essential hypertension 2/15/2016    01/28/2004--treatment  for hypertension begun.   • Family history of colon cancer 2/16/2016    Maternal grandfather had colon cancer at age 86.   • History of Palpitations 07/12/2010 07/12/2010--24-hour Holter monitor revealed several PVCs and fewer PACs. No significant arrhythmia. Stress echocardiogram 06/06/2010 normal.   • Hyperlipidemia 2/15/2016    12/27/2010--treatment for hyperlipidemia begun.   • Impaired fasting glucose 2/15/2016    11/01/2007--initial diagnosis.   • Lumbar scoliosis 2/15/2016    09/23/2011--MRI lumbar spine revealed mild to moderate degenerative arthritis, dextro scoliosis with the apex centered around L3 and L4.   • Multiple actinic keratoses 3/23/2017    03/23/2017--1 actinic keratosis on the left forearm, 2 actinic keratoses on the right forearm, were destroyed with liquid nitrogen today.   • Myalgia, secondary Lipitor 3/23/2017    05/04/2017--patient seen in follow-up and reports return of myalgias after changing to 10 mg of Crestor daily.  He questions whether or not 5 mg per day might do the job without myalgias.  After reviewing his NMR profile which was excellent, I think this would be a good idea.  He continues to have myalgias related to statin therapy, another consideration would be Zetia.  03/23/2017--patient seen in follow-up and reports his myalgias have essentially resolved after discontinuation of Lipitor.  We will try generic Crestor 10 mg per day.  Reassess with lab in about 6 weeks.  03/02/2017--patient reports muscular aches and pains, particularly lower extremities possibly related to Lipitor, despite taking coenzyme Q 10 300 mg per day.  Lipitor discontinued.   • Statin intolerance 1/30/2018   • Vitamin D deficiency 2/15/2016         Past Surgical History:   Procedure Laterality Date   • COLONOSCOPY  04/11/2011 04/11/2011--normal colonoscopy.    • COLONOSCOPY  12/10/2005    12/10/2005--normal colonoscopy.   • COLONOSCOPY N/A 7/13/2016 07/13/2016--normal colonoscopy to the cecum  with a good prep.   • JOINT MANIPULATION Left 8/16/2018    Procedure: LT SHOLDER MANIPULATION;  Surgeon: Mo Lemus MD;  Location: [facility] OR [facility] [place];  Service: Orthopedics   • LAPAROSCOPIC CHOLECYSTECTOMY  08/22/2011 08/22/2011--laparoscopic cholecystectomy         No Known Allergies        Current Outpatient Medications:   •  aspirin 81 MG EC tablet, Take 81 mg by mouth Daily., Disp: , Rfl:   •  Cholecalciferol (VITAMIN D) 2000 units capsule, Take 2,000 Units by mouth Daily., Disp: , Rfl:   •  ezetimibe (ZETIA) 10 MG tablet, TAKE 1 TABLET EVERY EVENING, Disp: 90 tablet, Rfl: 1  •  Multiple Vitamin (MULTIVITAMINS PO), Take by mouth daily., Disp: , Rfl:   •  olmesartan-hydrochlorothiazide (BENICAR HCT) 40-12.5 MG per tablet, TAKE 1 TABLET DAILY, Disp: 90 tablet, Rfl: 0  •  TOPROL XL 25 MG 24 hr tablet, TAKE 1 TABLET DAILY, Disp: 90 tablet, Rfl: 0      Family History   Problem Relation Age of Onset   • Heart attack Mother         Mother had a Myocardial Infarction at age 81. Status post PTCA with stents.   • Heart attack Father         Father had a myocardial infarction at age 60.   • Cancer Maternal Grandfather         MGF was diagnosed with colon cancer at age 86   • Malig Hyperthermia Neg Hx          Social History     Socioeconomic History   • Marital status:      Spouse name: Not on file   • Number of children: Not on file   • Years of education: Not on file   • Highest education level: Not on file   Occupational History   • Occupation: Civil    Social Needs   • Financial resource strain: Not hard at all   • Food insecurity:     Worry: Never true     Inability: Never true   • Transportation needs:     Medical: No     Non-medical: No   Tobacco Use   • Smoking status: Former Smoker     Packs/day: 0.50     Years: 18.00     Pack years: 9.00     Types: Cigarettes   • Smokeless tobacco: Never Used   • Tobacco comment: Stopped smoking at age 41.   Substance and Sexual Activity   • Alcohol use:  "Yes     Frequency: 2-4 times a month     Drinks per session: 1 or 2     Binge frequency: Never     Comment: SOCIALLY   • Drug use: No   • Sexual activity: Yes     Partners: Female   Lifestyle   • Physical activity:     Days per week: 3 days     Minutes per session: 20 min   • Stress: To some extent   Relationships   • Social connections:     Talks on phone: Patient refused     Gets together: Patient refused     Attends Orthodox service: Patient refused     Active member of club or organization: Patient refused     Attends meetings of clubs or organizations: Patient refused     Relationship status: Patient refused         Vitals:    07/30/19 1242   BP: 156/80   BP Location: Left arm   Pulse: 77   SpO2: 99%   Weight: 85 kg (187 lb 6.4 oz)   Height: 177.8 cm (70\")          Physical Exam:    General: Alert and oriented x 3.  No acute distress.  Normal affect.  HEENT: Pupils equal, round, reactive to light; extraocular movements intact; sclerae nonicteric; pharynx, ear canals and TMs normal.  Neck: Without JVD, thyromegaly, bruit, or adenopathy.  Lungs: Clear to auscultation in all fields.  Heart: Regular rate and rhythm without murmur, rub, gallop, or click.  Abdomen: Soft, nontender, without hepatosplenomegaly or hernia.  Bowel sounds normal.  : Deferred.  Rectal: Deferred.  Extremities: Without clubbing, cyanosis, edema, or pulse deficit.  Neurologic: Intact without focal deficit.  Normal station and gait observed during ingress and egress from the examination room.  Skin: Without significant lesion.  Musculoskeletal: Unremarkable.    Lab/other results:      Assessment/Plan:     Diagnosis Plan   1. Benign essential hypertension     2. Non morbid obesity     3. Hyperlipidemia, unspecified hyperlipidemia type     4. Impaired fasting glucose     5. Vitamin D deficiency     6. Statin intolerance     7. Myalgia, secondary Lipitor     8. Rising PSA level     9. Routine physical examination     10. Therapeutic drug " monitoring       Patient's blood pressure is better but not at goal.  His weight is down 8 pounds in 2 months.  Patient would like to continue with weight loss and lifestyle changes and I certainly agree with that.  Note that several diagnoses were added to the assessment and plan in order to facilitate ordering future lab work for his physical next year.    Plan is as follows: Patient will continue to work on weight loss.  Exercise would be helpful as well.  I will have him follow-up in about 4 months to reassess the situation.  I will go ahead and set up his annual for next June.        Procedures

## 2019-09-24 RX ORDER — METOPROLOL SUCCINATE 25 MG
TABLET, EXTENDED RELEASE 24 HR ORAL
Qty: 90 TABLET | Refills: 4 | Status: SHIPPED | OUTPATIENT
Start: 2019-09-24 | End: 2020-12-17

## 2019-09-24 RX ORDER — OLMESARTAN MEDOXOMIL AND HYDROCHLOROTHIAZIDE 40/12.5 40; 12.5 MG/1; MG/1
TABLET ORAL
Qty: 90 TABLET | Refills: 4 | Status: SHIPPED | OUTPATIENT
Start: 2019-09-24 | End: 2020-12-10

## 2019-11-25 ENCOUNTER — OFFICE VISIT (OUTPATIENT)
Dept: INTERNAL MEDICINE | Facility: CLINIC | Age: 64
End: 2019-11-25

## 2019-11-25 VITALS
DIASTOLIC BLOOD PRESSURE: 90 MMHG | OXYGEN SATURATION: 98 % | WEIGHT: 189 LBS | SYSTOLIC BLOOD PRESSURE: 168 MMHG | HEIGHT: 70 IN | BODY MASS INDEX: 27.06 KG/M2 | HEART RATE: 71 BPM

## 2019-11-25 DIAGNOSIS — E78.5 HYPERLIPIDEMIA, UNSPECIFIED HYPERLIPIDEMIA TYPE: Chronic | ICD-10-CM

## 2019-11-25 DIAGNOSIS — R73.01 IMPAIRED FASTING GLUCOSE: Chronic | ICD-10-CM

## 2019-11-25 DIAGNOSIS — Z23 NEED FOR INFLUENZA VACCINATION: ICD-10-CM

## 2019-11-25 DIAGNOSIS — E66.9 NON MORBID OBESITY: ICD-10-CM

## 2019-11-25 DIAGNOSIS — I10 BENIGN ESSENTIAL HYPERTENSION: Primary | Chronic | ICD-10-CM

## 2019-11-25 PROCEDURE — 99214 OFFICE O/P EST MOD 30 MIN: CPT | Performed by: INTERNAL MEDICINE

## 2019-11-25 PROCEDURE — 90674 CCIIV4 VAC NO PRSV 0.5 ML IM: CPT | Performed by: INTERNAL MEDICINE

## 2019-11-25 PROCEDURE — 90471 IMMUNIZATION ADMIN: CPT | Performed by: INTERNAL MEDICINE

## 2019-11-25 RX ORDER — AMLODIPINE BESYLATE 2.5 MG/1
TABLET ORAL
Qty: 30 TABLET | Refills: 1 | Status: SHIPPED | OUTPATIENT
Start: 2019-11-25 | End: 2020-01-02

## 2019-11-25 NOTE — PROGRESS NOTES
11/25/2019    Patient Information  George Chavez                                                                                          [address] [facility] [address]      1955  [unfilled]  There is no work phone number on file.    Chief Complaint:     Follow-up hypertension, nonmorbid obesity, hyperlipidemia, impaired fasting glucose.  No new acute complaints.    History of Present Illness:    Patient with a history of hypertension that has been somewhat fluctuating and overall poorly controlled presents today for a follow-up on the blood pressure as well as his efforts at weight loss which I think is contributing to his high blood pressure.  Unfortunately, patient has not lost any weight and actually has gained a few pounds since he was last evaluated several months ago.  His past medical history reviewed and updated were necessary including health maintenance parameters.  This reveals he will be up-to-date or else accounted for after today's visit.    Review of Systems   Constitution: Negative.   HENT: Negative.    Eyes: Negative.    Cardiovascular: Negative.    Respiratory: Negative.    Endocrine: Negative.    Hematologic/Lymphatic: Negative.    Skin: Negative.    Musculoskeletal: Negative.    Gastrointestinal: Negative.    Genitourinary: Negative.    Neurological: Negative.    Psychiatric/Behavioral: Negative.    Allergic/Immunologic: Negative.        Active Problems:    Patient Active Problem List   Diagnosis   • Benign essential hypertension   • Lumbar scoliosis   • Hyperlipidemia   • Impaired fasting glucose   • Vitamin D deficiency   • Therapeutic drug monitoring   • Routine physical examination   • Family history of colon cancer   • Myalgia, secondary Lipitor   • Multiple actinic keratoses   • Statin intolerance   • Rising PSA level   • Non morbid obesity         Past Medical History:   Diagnosis Date   • Benign essential hypertension 2/15/2016    01/28/2004--treatment  for hypertension begun.   • Family history of colon cancer 2/16/2016    Maternal grandfather had colon cancer at age 86.   • History of Palpitations 07/12/2010 07/12/2010--24-hour Holter monitor revealed several PVCs and fewer PACs. No significant arrhythmia. Stress echocardiogram 06/06/2010 normal.   • Hyperlipidemia 2/15/2016    12/27/2010--treatment for hyperlipidemia begun.   • Impaired fasting glucose 2/15/2016    11/01/2007--initial diagnosis.   • Lumbar scoliosis 2/15/2016    09/23/2011--MRI lumbar spine revealed mild to moderate degenerative arthritis, dextro scoliosis with the apex centered around L3 and L4.   • Multiple actinic keratoses 3/23/2017    03/23/2017--1 actinic keratosis on the left forearm, 2 actinic keratoses on the right forearm, were destroyed with liquid nitrogen today.   • Myalgia, secondary Lipitor 3/23/2017    05/04/2017--patient seen in follow-up and reports return of myalgias after changing to 10 mg of Crestor daily.  He questions whether or not 5 mg per day might do the job without myalgias.  After reviewing his NMR profile which was excellent, I think this would be a good idea.  He continues to have myalgias related to statin therapy, another consideration would be Zetia.  03/23/2017--patient seen in follow-up and reports his myalgias have essentially resolved after discontinuation of Lipitor.  We will try generic Crestor 10 mg per day.  Reassess with lab in about 6 weeks.  03/02/2017--patient reports muscular aches and pains, particularly lower extremities possibly related to Lipitor, despite taking coenzyme Q 10 300 mg per day.  Lipitor discontinued.   • Statin intolerance 1/30/2018   • Vitamin D deficiency 2/15/2016         Past Surgical History:   Procedure Laterality Date   • COLONOSCOPY  04/11/2011 04/11/2011--normal colonoscopy.    • COLONOSCOPY  12/10/2005    12/10/2005--normal colonoscopy.   • COLONOSCOPY N/A 7/13/2016 07/13/2016--normal colonoscopy to the cecum  with a good prep.   • JOINT MANIPULATION Left 8/16/2018    Procedure: LT SHOLDER MANIPULATION;  Surgeon: Mo Lemus MD;  Location[place] [facility] [place] [facility] [place];  Service: Orthopedics   • LAPAROSCOPIC CHOLECYSTECTOMY  08/22/2011 08/22/2011--laparoscopic cholecystectomy         No Known Allergies        Current Outpatient Medications:   •  aspirin 81 MG EC tablet, Take 81 mg by mouth Daily., Disp: , Rfl:   •  Cholecalciferol (VITAMIN D) 2000 units capsule, Take 2,000 Units by mouth Daily., Disp: , Rfl:   •  ezetimibe (ZETIA) 10 MG tablet, TAKE 1 TABLET EVERY EVENING, Disp: 90 tablet, Rfl: 1  •  Multiple Vitamin (MULTIVITAMINS PO), Take by mouth daily., Disp: , Rfl:   •  olmesartan-hydrochlorothiazide (BENICAR HCT) 40-12.5 MG per tablet, TAKE 1 TABLET DAILY, Disp: 90 tablet, Rfl: 4  •  TOPROL XL 25 MG 24 hr tablet, TAKE 1 TABLET DAILY, Disp: 90 tablet, Rfl: 4      Family History   Problem Relation Age of Onset   • Heart attack Mother         Mother had a Myocardial Infarction at age 81. Status post PTCA with stents.   • Heart attack Father         Father had a myocardial infarction at age 60.   • Cancer Maternal Grandfather         MGF was diagnosed with colon cancer at age 86   • Malig Hyperthermia Neg Hx          Social History     Socioeconomic History   • Marital status:      Spouse name: Not on file   • Number of children: Not on file   • Years of education: Not on file   • Highest education level: Not on file   Occupational History   • Occupation: Civil    Social Needs   • Financial resource strain: Not hard at all   • Food insecurity:     Worry: Never true     Inability: Never true   • Transportation needs:     Medical: No     Non-medical: No   Tobacco Use   • Smoking status: Former Smoker     Packs/day: 0.50     Years: 18.00     Pack years: 9.00     Types: Cigarettes   • Smokeless tobacco: Never Used   • Tobacco comment: Stopped smoking at age 41.   Substance and Sexual Activity   • Alcohol use:  "Yes     Frequency: 2-4 times a month     Drinks per session: 1 or 2     Binge frequency: Never     Comment: SOCIALLY   • Drug use: No   • Sexual activity: Yes     Partners: Female   Lifestyle   • Physical activity:     Days per week: 3 days     Minutes per session: 20 min   • Stress: Not at all   Relationships   • Social connections:     Talks on phone: Patient refused     Gets together: Patient refused     Attends [employer] service: Patient refused     Active member of club or organization: Patient refused     Attends meetings of clubs or organizations: Patient refused     Relationship status: Patient refused         Vitals:    11/25/19 0857   BP: 168/90   BP Location: Left arm   Pulse: 71   SpO2: 98%   Weight: 85.7 kg (189 lb)   Height: 177.8 cm (70\")        Body mass index is 27.12 kg/m².      Physical Exam:    General: Alert and oriented x 3.  No acute distress.  Normal affect.  Overweight.  HEENT: Pupils equal, round, reactive to light; extraocular movements intact; sclerae nonicteric; pharynx, ear canals and TMs normal.  Neck: Without JVD, thyromegaly, bruit, or adenopathy.  Lungs: Clear to auscultation in all fields.  Heart: Regular rate and rhythm without murmur, rub, gallop, or click.  Abdomen: Soft, nontender, without hepatosplenomegaly or hernia.  Bowel sounds normal.  : Deferred.  Rectal: Deferred.  Extremities: Without clubbing, cyanosis, edema, or pulse deficit.  Neurologic: Intact without focal deficit.  Normal station and gait observed during ingress and egress from the examination room.  Skin: Without significant lesion.  Musculoskeletal: Unremarkable.    Lab/other results:      Assessment/Plan:     Diagnosis Plan   1. Benign essential hypertension     2. Non morbid obesity     3. Hyperlipidemia, unspecified hyperlipidemia type     4. Impaired fasting glucose     5. Need for influenza vaccination  Flucelvax Quad=>4Years ([id number])     Patient blood pressure is not at goal although patient's " weight is near ideal.  We definitely need to make a medication adjustment.  This is particularly true given several other co-risk factors including hyperlipidemia and impaired fasting glucose.    Plan is as follows: Add amlodipine 2.5 mg/day.  Patient will follow-up in about 4 to 5 weeks to reassess.  I explained to patient that amlodipine can sometimes take a while to reach full effect.  Also counseled him that he is on the minimal dose.      Procedures

## 2019-12-05 ENCOUNTER — OFFICE VISIT (OUTPATIENT)
Dept: INTERNAL MEDICINE | Facility: CLINIC | Age: 64
End: 2019-12-05

## 2019-12-05 VITALS
HEART RATE: 66 BPM | WEIGHT: 188.2 LBS | HEIGHT: 70 IN | OXYGEN SATURATION: 98 % | SYSTOLIC BLOOD PRESSURE: 166 MMHG | DIASTOLIC BLOOD PRESSURE: 90 MMHG | BODY MASS INDEX: 26.94 KG/M2

## 2019-12-05 DIAGNOSIS — H66.001 NON-RECURRENT ACUTE SUPPURATIVE OTITIS MEDIA OF RIGHT EAR WITHOUT SPONTANEOUS RUPTURE OF TYMPANIC MEMBRANE: ICD-10-CM

## 2019-12-05 DIAGNOSIS — I10 BENIGN ESSENTIAL HYPERTENSION: Chronic | ICD-10-CM

## 2019-12-05 DIAGNOSIS — J01.00 ACUTE NON-RECURRENT MAXILLARY SINUSITIS: Primary | ICD-10-CM

## 2019-12-05 DIAGNOSIS — H61.23 BILATERAL IMPACTED CERUMEN: ICD-10-CM

## 2019-12-05 PROCEDURE — 99214 OFFICE O/P EST MOD 30 MIN: CPT | Performed by: INTERNAL MEDICINE

## 2019-12-05 PROCEDURE — 69210 REMOVE IMPACTED EAR WAX UNI: CPT | Performed by: INTERNAL MEDICINE

## 2019-12-05 RX ORDER — CEFDINIR 300 MG/1
CAPSULE ORAL
Qty: 20 CAPSULE | Refills: 0 | Status: SHIPPED | OUTPATIENT
Start: 2019-12-05 | End: 2019-12-05 | Stop reason: SDUPTHER

## 2019-12-05 RX ORDER — CEFDINIR 300 MG/1
CAPSULE ORAL
Qty: 20 CAPSULE | Refills: 0 | Status: SHIPPED | OUTPATIENT
Start: 2019-12-05 | End: 2020-01-02

## 2019-12-05 NOTE — PROGRESS NOTES
12/05/2019    Patient Information  George Chavez                                                                                          [address] [facility] [address]      1955  [unfilled]  There is no work phone number on file.    Chief Complaint:     Complaining of cold and sinus symptoms.  Complaining of right ear pain.    History of Present Illness:    Patient with history of hypertension and has not been optimally controlled and we are in the process of adjusting his medications.  He also has hyperlipidemia and impaired fasting glucose as well as statin intolerance.  He presents today with complaints of head congestion and posterior nasal drainage associated with right ear pain as described below:    December 5, 2019--patient presents with approximately 4 to 5-day history of head congestion, sinus pressure, profuse posterior nasal drainage resulting in a cough productive of tan/yellowish phlegm.  Patient felt a fever initially and also had some aches and pains.  He has had his influenza vaccine.  He developed pain in his right ear that was quite piercing and this is what prompted him to come in in addition to the other symptoms.  On exam there is boggy nasal mucosa but no definite tenderness to percussion over the sinuses.  His pharynx appears normal.  His lungs are clear.  There are bilateral cerumen impactions present which obscured the tympanic membrane.  This was removed with irrigation and curettage.  The right tympanic membrane appears to be somewhat erythematous and I am not sure if this is from the procedure or if he is developing an early otitis media.  Plan is to treat with cefdinir 300 mg p.o. twice daily x10 days and Stahist 1 p.o. twice daily for congestion.    Review of Systems   Constitution: Positive for fever. Negative for chills.   HENT: Positive for congestion and ear pain.         Profuse posterior nasal drainage   Eyes: Negative.    Cardiovascular:  Negative.    Respiratory: Positive for cough and sputum production.    Endocrine: Negative.    Hematologic/Lymphatic: Negative.    Skin: Negative.    Musculoskeletal: Negative.    Gastrointestinal: Negative.    Genitourinary: Negative.    Neurological: Negative.    Psychiatric/Behavioral: Negative.    Allergic/Immunologic: Negative.        Active Problems:    Patient Active Problem List   Diagnosis   • Benign essential hypertension   • Lumbar scoliosis   • Hyperlipidemia   • Impaired fasting glucose   • Vitamin D deficiency   • Therapeutic drug monitoring   • Routine physical examination   • Family history of colon cancer   • Myalgia, secondary Lipitor   • Multiple actinic keratoses   • Statin intolerance   • Rising PSA level   • Non morbid obesity   • Acute non-recurrent maxillary sinusitis   • Bilateral impacted cerumen   • Non-recurrent acute suppurative otitis media of right ear         Past Medical History:   Diagnosis Date   • Benign essential hypertension 2/15/2016    01/28/2004--treatment for hypertension begun.   • Family history of colon cancer 2/16/2016    Maternal grandfather had colon cancer at age 86.   • History of Palpitations 07/12/2010 07/12/2010--24-hour Holter monitor revealed several PVCs and fewer PACs. No significant arrhythmia. Stress echocardiogram 06/06/2010 normal.   • Hyperlipidemia 2/15/2016    12/27/2010--treatment for hyperlipidemia begun.   • Impaired fasting glucose 2/15/2016    11/01/2007--initial diagnosis.   • Lumbar scoliosis 2/15/2016    09/23/2011--MRI lumbar spine revealed mild to moderate degenerative arthritis, dextro scoliosis with the apex centered around L3 and L4.   • Multiple actinic keratoses 3/23/2017    03/23/2017--1 actinic keratosis on the left forearm, 2 actinic keratoses on the right forearm, were destroyed with liquid nitrogen today.   • Myalgia, secondary Lipitor 3/23/2017    05/04/2017--patient seen in follow-up and reports return of myalgias after changing  to 10 mg of Crestor daily.  He questions whether or not 5 mg per day might do the job without myalgias.  After reviewing his NMR profile which was excellent, I think this would be a good idea.  He continues to have myalgias related to statin therapy, another consideration would be Zetia.  03/23/2017--patient seen in follow-up and reports his myalgias have essentially resolved after discontinuation of Lipitor.  We will try generic Crestor 10 mg per day.  Reassess with lab in about 6 weeks.  03/02/2017--patient reports muscular aches and pains, particularly lower extremities possibly related to Lipitor, despite taking coenzyme Q 10 300 mg per day.  Lipitor discontinued.   • Statin intolerance 1/30/2018   • Vitamin D deficiency 2/15/2016         Past Surgical History:   Procedure Laterality Date   • COLONOSCOPY  04/11/2011 04/11/2011--normal colonoscopy.    • COLONOSCOPY  12/10/2005    12/10/2005--normal colonoscopy.   • COLONOSCOPY N/A 7/13/2016 07/13/2016--normal colonoscopy to the cecum with a good prep.   • JOINT MANIPULATION Left 8/16/2018    Procedure: LT SHOLDER MANIPULATION;  Surgeon: Mo Lemus MD;  Location: [facility] OR [facility] [place];  Service: Orthopedics   • LAPAROSCOPIC CHOLECYSTECTOMY  08/22/2011 08/22/2011--laparoscopic cholecystectomy         No Known Allergies        Current Outpatient Medications:   •  amLODIPine (NORVASC) 2.5 MG tablet, Take 1 p.o. every morning for high blood pressure, Disp: 30 tablet, Rfl: 1  •  aspirin 81 MG EC tablet, Take 81 mg by mouth Daily., Disp: , Rfl:   •  Cholecalciferol (VITAMIN D) 2000 units capsule, Take 2,000 Units by mouth Daily., Disp: , Rfl:   •  ezetimibe (ZETIA) 10 MG tablet, TAKE 1 TABLET EVERY EVENING, Disp: 90 tablet, Rfl: 1  •  Multiple Vitamin (MULTIVITAMINS PO), Take by mouth daily., Disp: , Rfl:   •  olmesartan-hydrochlorothiazide (BENICAR HCT) 40-12.5 MG per tablet, TAKE 1 TABLET DAILY, Disp: 90 tablet, Rfl: 4  •  TOPROL XL 25 MG 24 hr tablet, TAKE 1  TABLET DAILY, Disp: 90 tablet, Rfl: 4  •  cefdinir (OMNICEF) 300 MG capsule, Take 1 p.o. twice daily until gone, Disp: 20 capsule, Rfl: 0  •  Chlorcyclizine-Pseudoephed (STAHIST AD) 25-60 MG tablet, Take 1 p.o. every 6 hours as needed for congestion and drainage.  Not greater than 3 in 24 hours, Disp: 60 tablet, Rfl: 0      Family History   Problem Relation Age of Onset   • Heart attack Mother         Mother had a Myocardial Infarction at age 81. Status post PTCA with stents.   • Heart attack Father         Father had a myocardial infarction at age 60.   • Cancer Maternal Grandfather         MGF was diagnosed with colon cancer at age 86   • Malig Hyperthermia Neg Hx          Social History     Socioeconomic History   • Marital status:      Spouse name: Not on file   • Number of children: Not on file   • Years of education: Not on file   • Highest education level: Not on file   Occupational History   • Occupation: Civil    Social Needs   • Financial resource strain: Not hard at all   • Food insecurity:     Worry: Never true     Inability: Never true   • Transportation needs:     Medical: No     Non-medical: No   Tobacco Use   • Smoking status: Former Smoker     Packs/day: 0.50     Years: 18.00     Pack years: 9.00     Types: Cigarettes   • Smokeless tobacco: Never Used   • Tobacco comment: Stopped smoking at age 41.   Substance and Sexual Activity   • Alcohol use: Yes     Frequency: 2-3 times a week     Drinks per session: 1 or 2     Binge frequency: Never     Comment: SOCIALLY   • Drug use: No   • Sexual activity: Yes     Partners: Female   Lifestyle   • Physical activity:     Days per week: 7 days     Minutes per session: 60 min   • Stress: Only a little   Relationships   • Social connections:     Talks on phone: Patient refused     Gets together: Patient refused     Attends Denominational service: Patient refused     Active member of club or organization: Patient refused     Attends meetings of  "clubs or organizations: Patient refused     Relationship status: Patient refused         Vitals:    12/05/19 1024   BP: 166/90   BP Location: Left arm   Pulse: 66   SpO2: 98%   Weight: 85.4 kg (188 lb 3.2 oz)   Height: 177.8 cm (70\")        Body mass index is 27 kg/m².      Physical Exam:    General: Alert and oriented x 3.  No acute distress.  Normal affect.  HEENT: Pupils equal, round, reactive to light; extraocular movements intact; sclerae nonicteric; pharynx, ear canals  normal.  There are bilateral cerumen impactions present which were removed and post procedure examination reveals an erythematous right tympanic membrane without perforation.  There is boggy nasal mucosa but no tenderness to percussion over the sinuses.  Neck: Without JVD, thyromegaly, bruit, or adenopathy.  Lungs: Clear to auscultation in all fields.  Heart: Regular rate and rhythm without murmur, rub, gallop, or click.  Abdomen: Soft, nontender, without hepatosplenomegaly or hernia.  Bowel sounds normal.  : Deferred.  Rectal: Deferred.  Extremities: Without clubbing, cyanosis, edema, or pulse deficit.  Neurologic: Intact without focal deficit.  Normal station and gait observed during ingress and egress from the examination room.  Skin: Without significant lesion.  Musculoskeletal: Unremarkable.    Lab/other results:      Assessment/Plan:     Diagnosis Plan   1. Acute non-recurrent maxillary sinusitis  cefdinir (OMNICEF) 300 MG capsule    Chlorcyclizine-Pseudoephed (STAHIST AD) 25-60 MG tablet   2. Non-recurrent acute suppurative otitis media of right ear without spontaneous rupture of tympanic membrane  cefdinir (OMNICEF) 300 MG capsule    Chlorcyclizine-Pseudoephed (STAHIST AD) 25-60 MG tablet   3. Bilateral impacted cerumen     4. Benign essential hypertension       Patient presents with a history that is somewhat consistent with a viral URI but I think it has progressed into a maxillary sinusitis and early right otitis media.  I do feel " that antibiotics are indicated in this patient.  Patient had bilateral cerumen impactions that had to be removed prior to completing evaluation today.  See procedure note below.  His blood pressure is elevated but I recently made a change, specifically we added amlodipine 2.5 mg/day.  I think it is a little too early to increase the dose of this.  Patient does have a follow-up appointment in about 3 weeks to reassess.    Plan is as follows: Cefdinir 300 mg p.o. twice daily x10 days.  Stay hist 1 p.o. twice daily as needed for congestion and drainage.  Patient should contact me if his symptoms persist and certainly if they worsen in any way.    Procedures    Verbal informed consent given.  Risks, benefits, potential complications discussed.  Bilateral cerumen impactions removed with irrigation and curettage.  Tympanic membranes are intact although the right TM is erythematous.  Patient tolerated procedure well without apparent complication.  Post procedure instructions given.

## 2020-01-02 ENCOUNTER — OFFICE VISIT (OUTPATIENT)
Dept: INTERNAL MEDICINE | Facility: CLINIC | Age: 65
End: 2020-01-02

## 2020-01-02 VITALS
SYSTOLIC BLOOD PRESSURE: 156 MMHG | HEART RATE: 80 BPM | HEIGHT: 70 IN | DIASTOLIC BLOOD PRESSURE: 80 MMHG | BODY MASS INDEX: 27.14 KG/M2 | OXYGEN SATURATION: 98 % | WEIGHT: 189.6 LBS

## 2020-01-02 DIAGNOSIS — H93.11 TINNITUS OF RIGHT EAR: ICD-10-CM

## 2020-01-02 DIAGNOSIS — I10 BENIGN ESSENTIAL HYPERTENSION: Primary | Chronic | ICD-10-CM

## 2020-01-02 DIAGNOSIS — H66.001 NON-RECURRENT ACUTE SUPPURATIVE OTITIS MEDIA OF RIGHT EAR WITHOUT SPONTANEOUS RUPTURE OF TYMPANIC MEMBRANE: ICD-10-CM

## 2020-01-02 PROBLEM — H61.23 BILATERAL IMPACTED CERUMEN: Status: RESOLVED | Noted: 2019-12-05 | Resolved: 2020-01-02

## 2020-01-02 PROBLEM — J01.00 ACUTE NON-RECURRENT MAXILLARY SINUSITIS: Status: RESOLVED | Noted: 2019-12-05 | Resolved: 2020-01-02

## 2020-01-02 PROBLEM — E66.9 NON MORBID OBESITY: Chronic | Status: ACTIVE | Noted: 2019-07-30

## 2020-01-02 PROCEDURE — 99214 OFFICE O/P EST MOD 30 MIN: CPT | Performed by: INTERNAL MEDICINE

## 2020-01-02 RX ORDER — AMLODIPINE BESYLATE 5 MG/1
TABLET ORAL
Qty: 30 TABLET | Refills: 3 | Status: SHIPPED | OUTPATIENT
Start: 2020-01-02 | End: 2020-06-01

## 2020-01-02 NOTE — PROGRESS NOTES
01/02/2020    Patient Information  George Chavez                                                                                          [id number] [facility] [address]      1955  [unfilled]  There is no work phone number on file.    Chief Complaint:     Follow-up hypertension, recent medication adjustment, complaints of continued problems with right ear.    History of Present Illness:    Patient with a history of hypertension that is been somewhat fluctuating and difficult to control presents today for follow-up after we added a small dose of amlodipine to his regimen which he seems to be tolerating.  Patient was seen recently for what appeared to be acute superlative otitis media that was noted after removal of cerumen impactions.  Patient is still having problem with his right ear as described below:    January 2, 2020--patient seen in follow-up and reports he still having some discomfort in his right ear and this was associated with tinnitus as well.  On exam there is no erythema of the tympanic membrane but it does look a little dull and I cannot tell for sure if there is an air-fluid level or not.  ENT referral given.    December 5, 2019--patient presents with approximately 4 to 5-day history of head congestion, sinus pressure, profuse posterior nasal drainage resulting in a cough productive of tan/yellowish phlegm.  Patient felt a fever initially and also had some aches and pains.  He has had his influenza vaccine.  He developed pain in his right ear that was quite piercing and this is what prompted him to come in in addition to the other symptoms.  On exam there is boggy nasal mucosa but no definite tenderness to percussion over the sinuses.  His pharynx appears normal.  His lungs are clear.  There are bilateral cerumen impactions present which obscured the tympanic membrane.  This was removed with irrigation and curettage.  The right tympanic membrane appears to be somewhat  erythematous and I am not sure if this is from the procedure or if he is developing an early otitis media.  Plan is to treat with cefdinir 300 mg p.o. twice daily x10 days and Stahist 1 p.o. twice daily for congestion.    Past medical history reviewed and updated were necessary including health maintenance parameters.  This reveals he is up-to-date or else accounted for.    Review of Systems   Constitution: Negative.   HENT: Positive for ear pain and hearing loss.    Eyes: Negative.    Cardiovascular: Negative.    Respiratory: Negative.    Endocrine: Negative.    Hematologic/Lymphatic: Negative.    Skin: Negative.    Musculoskeletal: Negative.    Gastrointestinal: Negative.    Genitourinary: Negative.    Neurological: Negative.    Psychiatric/Behavioral: Negative.    Allergic/Immunologic: Negative.        Active Problems:    Patient Active Problem List   Diagnosis   • Benign essential hypertension   • Lumbar scoliosis   • Hyperlipidemia   • Impaired fasting glucose   • Vitamin D deficiency   • Therapeutic drug monitoring   • Routine physical examination   • Family history of colon cancer   • Myalgia, secondary Lipitor   • Multiple actinic keratoses   • Statin intolerance   • Rising PSA level   • Non morbid obesity   • Non-recurrent acute suppurative otitis media of right ear   • Tinnitus of right ear         Past Medical History:   Diagnosis Date   • Benign essential hypertension 2/15/2016    01/28/2004--treatment for hypertension begun.   • Family history of colon cancer 2/16/2016    Maternal grandfather had colon cancer at age 86.   • History of Palpitations 07/12/2010 07/12/2010--24-hour Holter monitor revealed several PVCs and fewer PACs. No significant arrhythmia. Stress echocardiogram 06/06/2010 normal.   • Hyperlipidemia 2/15/2016    12/27/2010--treatment for hyperlipidemia begun.   • Impaired fasting glucose 2/15/2016    11/01/2007--initial diagnosis.   • Lumbar scoliosis 2/15/2016    09/23/2011--MRI  lumbar spine revealed mild to moderate degenerative arthritis, dextro scoliosis with the apex centered around L3 and L4.   • Multiple actinic keratoses 3/23/2017    03/23/2017--1 actinic keratosis on the left forearm, 2 actinic keratoses on the right forearm, were destroyed with liquid nitrogen today.   • Myalgia, secondary Lipitor 3/23/2017    05/04/2017--patient seen in follow-up and reports return of myalgias after changing to 10 mg of Crestor daily.  He questions whether or not 5 mg per day might do the job without myalgias.  After reviewing his NMR profile which was excellent, I think this would be a good idea.  He continues to have myalgias related to statin therapy, another consideration would be Zetia.  03/23/2017--patient seen in follow-up and reports his myalgias have essentially resolved after discontinuation of Lipitor.  We will try generic Crestor 10 mg per day.  Reassess with lab in about 6 weeks.  03/02/2017--patient reports muscular aches and pains, particularly lower extremities possibly related to Lipitor, despite taking coenzyme Q 10 300 mg per day.  Lipitor discontinued.   • Non morbid obesity 7/30/2019   • Statin intolerance 1/30/2018   • Vitamin D deficiency 2/15/2016         Past Surgical History:   Procedure Laterality Date   • COLONOSCOPY  04/11/2011 04/11/2011--normal colonoscopy.    • COLONOSCOPY  12/10/2005    12/10/2005--normal colonoscopy.   • COLONOSCOPY N/A 7/13/2016 07/13/2016--normal colonoscopy to the cecum with a good prep.   • JOINT MANIPULATION Left 8/16/2018    Procedure: LT MICHELETER MANIPULATION;  Surgeon: Mo Lemus MD;  Location: [facility] OR [facility] [place];  Service: Orthopedics   • LAPAROSCOPIC CHOLECYSTECTOMY  08/22/2011 08/22/2011--laparoscopic cholecystectomy         No Known Allergies        Current Outpatient Medications:   •  amLODIPine (NORVASC) 2.5 MG tablet, Take 1 p.o. every morning for high blood pressure, Disp: 30 tablet, Rfl: 1  •  aspirin 81 MG EC tablet,  Take 81 mg by mouth Daily., Disp: , Rfl:   •  Cholecalciferol (VITAMIN D) 2000 units capsule, Take 2,000 Units by mouth Daily., Disp: , Rfl:   •  ezetimibe (ZETIA) 10 MG tablet, TAKE 1 TABLET EVERY EVENING, Disp: 90 tablet, Rfl: 1  •  Multiple Vitamin (MULTIVITAMINS PO), Take by mouth daily., Disp: , Rfl:   •  olmesartan-hydrochlorothiazide (BENICAR HCT) 40-12.5 MG per tablet, TAKE 1 TABLET DAILY, Disp: 90 tablet, Rfl: 4  •  TOPROL XL 25 MG 24 hr tablet, TAKE 1 TABLET DAILY, Disp: 90 tablet, Rfl: 4      Family History   Problem Relation Age of Onset   • Heart attack Mother         Mother had a Myocardial Infarction at age 81. Status post PTCA with stents.   • Heart attack Father         Father had a myocardial infarction at age 60.   • Cancer Maternal Grandfather         MGF was diagnosed with colon cancer at age 86   • Malig Hyperthermia Neg Hx          Social History     Socioeconomic History   • Marital status:      Spouse name: Not on file   • Number of children: Not on file   • Years of education: Not on file   • Highest education level: Not on file   Occupational History   • Occupation: Civil    Social Needs   • Financial resource strain: Not hard at all   • Food insecurity:     Worry: Never true     Inability: Never true   • Transportation needs:     Medical: No     Non-medical: No   Tobacco Use   • Smoking status: Former Smoker     Packs/day: 0.50     Years: 18.00     Pack years: 9.00     Types: Cigarettes   • Smokeless tobacco: Never Used   • Tobacco comment: Stopped smoking at age 41.   Substance and Sexual Activity   • Alcohol use: Yes     Frequency: 2-3 times a week     Drinks per session: 1 or 2     Binge frequency: Never     Comment: SOCIALLY   • Drug use: No   • Sexual activity: Yes     Partners: Female   Lifestyle   • Physical activity:     Days per week: 7 days     Minutes per session: 60 min   • Stress: Only a little   Relationships   • Social connections:     Talks on phone:  "Patient refused     Gets together: Patient refused     Attends Scientology service: Patient refused     Active member of club or organization: Patient refused     Attends meetings of clubs or organizations: Patient refused     Relationship status: Patient refused         Vitals:    01/02/20 0854   BP: 156/80   BP Location: Left arm   Pulse: 80   SpO2: 98%   Weight: 86 kg (189 lb 9.6 oz)   Height: 177.8 cm (70\")        Body mass index is 27.2 kg/m².      Physical Exam:    General: Alert and oriented x 3.  No acute distress.  Normal affect.  HEENT: Pupils equal, round, reactive to light; extraocular movements intact; sclerae nonicteric; pharynx, ear canals and TMs normal, except the right tympanic membrane appears a little dull but not erythematous.  I cannot be sure if there is an air-fluid level or not.  Neck: Without JVD, thyromegaly, bruit, or adenopathy.  Lungs: Clear to auscultation in all fields.  Heart: Regular rate and rhythm without murmur, rub, gallop, or click.  Abdomen: Soft, nontender, without hepatosplenomegaly or hernia.  Bowel sounds normal.  : Deferred.  Rectal: Deferred.  Extremities: Without clubbing, cyanosis, edema, or pulse deficit.  Neurologic: Intact without focal deficit.  Normal station and gait observed during ingress and egress from the examination room.  Skin: Without significant lesion.  Musculoskeletal: Unremarkable.    Lab/other results:      Assessment/Plan:     Diagnosis Plan   1. Benign essential hypertension     2. Non-recurrent acute suppurative otitis media of right ear without spontaneous rupture of tympanic membrane     3. Tinnitus of right ear       Patient's blood pressure is still fluctuating.  He checks his blood pressure at home and believes his monitor is correct and it appears the blood pressure is controlled most of the time at home.  However, it was elevated this morning at home and also is elevated today.  I explained to patient I am concerned that is probably " elevated at other times of stress and not just related to come in to the office here today.  He is on a low-dose of amlodipine and I think we should make a minor adjustment.  Patient continues to have problems with his right ear and there is no evidence for acute superlative otitis media after completion of antibiotics.  His problems sounds like eustachian tube dysfunction but I think he should be evaluated by ENT.  He is also having tinnitus as well.    Plan is as follows: Increase amlodipine to 5 mg/day.  ENT referral given.  I will have patient follow-up in about 4 to 5 weeks to reassess blood pressure.  Sooner for any problems.        Procedures

## 2020-01-06 RX ORDER — EZETIMIBE 10 MG/1
TABLET ORAL
Qty: 90 TABLET | Refills: 4 | Status: SHIPPED | OUTPATIENT
Start: 2020-01-06 | End: 2021-03-31

## 2020-02-13 ENCOUNTER — OFFICE VISIT (OUTPATIENT)
Dept: INTERNAL MEDICINE | Facility: CLINIC | Age: 65
End: 2020-02-13

## 2020-02-13 VITALS
OXYGEN SATURATION: 98 % | SYSTOLIC BLOOD PRESSURE: 156 MMHG | DIASTOLIC BLOOD PRESSURE: 94 MMHG | BODY MASS INDEX: 27.29 KG/M2 | HEART RATE: 67 BPM | WEIGHT: 190.6 LBS | HEIGHT: 70 IN

## 2020-02-13 DIAGNOSIS — I10 WHITE COAT SYNDROME WITH DIAGNOSIS OF HYPERTENSION: ICD-10-CM

## 2020-02-13 DIAGNOSIS — I10 BENIGN ESSENTIAL HYPERTENSION: Primary | Chronic | ICD-10-CM

## 2020-02-13 PROBLEM — H93.11 TINNITUS OF RIGHT EAR: Status: RESOLVED | Noted: 2020-01-02 | Resolved: 2020-02-13

## 2020-02-13 PROBLEM — H66.001 NON-RECURRENT ACUTE SUPPURATIVE OTITIS MEDIA OF RIGHT EAR: Status: RESOLVED | Noted: 2019-12-05 | Resolved: 2020-02-13

## 2020-02-13 PROCEDURE — 99213 OFFICE O/P EST LOW 20 MIN: CPT | Performed by: INTERNAL MEDICINE

## 2020-02-13 NOTE — PROGRESS NOTES
02/13/2020    Patient Information  George Chavez                                                                                          [id number] [facility] [address]      1955  [unfilled]  There is no work phone number on file.    Chief Complaint:     Follow-up hypertension and recent medication change.    History of Present Illness:    Patient with a history of very fluctuating high blood pressure and possible whitecoat syndrome presents today for follow-up after we increased amlodipine to a total of 5 mg/day.  Patient seems to be tolerating this well.  He brought in his blood pressure readings at home and they all look excellent.  Past medical history reviewed and updated were necessary including health maintenance parameters.  This reveals he is up-to-date or else accounted for.    Review of Systems   Constitution: Negative.   HENT: Negative.    Eyes: Negative.    Cardiovascular: Negative.    Respiratory: Negative.    Endocrine: Negative.    Hematologic/Lymphatic: Negative.    Skin: Negative.    Musculoskeletal: Negative.    Gastrointestinal: Negative.    Genitourinary: Negative.    Neurological: Negative.    Psychiatric/Behavioral: Negative.    Allergic/Immunologic: Negative.        Active Problems:    Patient Active Problem List   Diagnosis   • Benign essential hypertension   • Lumbar scoliosis   • Hyperlipidemia   • Impaired fasting glucose   • Vitamin D deficiency   • Therapeutic drug monitoring   • Routine physical examination   • Family history of colon cancer   • Myalgia, secondary Lipitor   • Multiple actinic keratoses   • Statin intolerance   • Rising PSA level   • Non morbid obesity   • Non-recurrent acute suppurative otitis media of right ear   • Tinnitus of right ear   • White coat syndrome with diagnosis of hypertension         Past Medical History:   Diagnosis Date   • Benign essential hypertension 2/15/2016    01/28/2004--treatment for hypertension begun.   •  Family history of colon cancer 2/16/2016    Maternal grandfather had colon cancer at age 86.   • History of Palpitations 07/12/2010 07/12/2010--24-hour Holter monitor revealed several PVCs and fewer PACs. No significant arrhythmia. Stress echocardiogram 06/06/2010 normal.   • Hyperlipidemia 2/15/2016    12/27/2010--treatment for hyperlipidemia begun.   • Impaired fasting glucose 2/15/2016    11/01/2007--initial diagnosis.   • Lumbar scoliosis 2/15/2016    09/23/2011--MRI lumbar spine revealed mild to moderate degenerative arthritis, dextro scoliosis with the apex centered around L3 and L4.   • Multiple actinic keratoses 3/23/2017    03/23/2017--1 actinic keratosis on the left forearm, 2 actinic keratoses on the right forearm, were destroyed with liquid nitrogen today.   • Myalgia, secondary Lipitor 3/23/2017    05/04/2017--patient seen in follow-up and reports return of myalgias after changing to 10 mg of Crestor daily.  He questions whether or not 5 mg per day might do the job without myalgias.  After reviewing his NMR profile which was excellent, I think this would be a good idea.  He continues to have myalgias related to statin therapy, another consideration would be Zetia.  03/23/2017--patient seen in follow-up and reports his myalgias have essentially resolved after discontinuation of Lipitor.  We will try generic Crestor 10 mg per day.  Reassess with lab in about 6 weeks.  03/02/2017--patient reports muscular aches and pains, particularly lower extremities possibly related to Lipitor, despite taking coenzyme Q 10 300 mg per day.  Lipitor discontinued.   • Non morbid obesity 7/30/2019   • Statin intolerance 1/30/2018   • Vitamin D deficiency 2/15/2016         Past Surgical History:   Procedure Laterality Date   • COLONOSCOPY  04/11/2011 04/11/2011--normal colonoscopy.    • COLONOSCOPY  12/10/2005    12/10/2005--normal colonoscopy.   • COLONOSCOPY N/A 7/13/2016 07/13/2016--normal colonoscopy to the  cecum with a good prep.   • JOINT MANIPULATION Left 8/16/2018    Procedure: LT SHOLDER MANIPULATION;  Surgeon: Mo Lemus MD;  Location: [facility] [place] [facility] [place];  Service: Orthopedics   • LAPAROSCOPIC CHOLECYSTECTOMY  08/22/2011 08/22/2011--laparoscopic cholecystectomy         No Known Allergies        Current Outpatient Medications:   •  amLODIPine (NORVASC) 5 MG tablet, Take 1 p.o. daily as directed for high blood pressure, Disp: 30 tablet, Rfl: 3  •  aspirin 81 MG EC tablet, Take 81 mg by mouth Daily., Disp: , Rfl:   •  Cholecalciferol (VITAMIN D) 2000 units capsule, Take 2,000 Units by mouth Daily., Disp: , Rfl:   •  ezetimibe (ZETIA) 10 MG tablet, TAKE 1 TABLET EVERY EVENING, Disp: 90 tablet, Rfl: 4  •  Multiple Vitamin (MULTIVITAMINS PO), Take by mouth daily., Disp: , Rfl:   •  olmesartan-hydrochlorothiazide (BENICAR HCT) 40-12.5 MG per tablet, TAKE 1 TABLET DAILY, Disp: 90 tablet, Rfl: 4  •  TOPROL XL 25 MG 24 hr tablet, TAKE 1 TABLET DAILY, Disp: 90 tablet, Rfl: 4      Family History   Problem Relation Age of Onset   • Heart attack Mother         Mother had a Myocardial Infarction at age 81. Status post PTCA with stents.   • Heart attack Father         Father had a myocardial infarction at age 60.   • Cancer Maternal Grandfather         MGF was diagnosed with colon cancer at age 86   • Malig Hyperthermia Neg Hx          Social History     Socioeconomic History   • Marital status:      Spouse name: Not on file   • Number of children: Not on file   • Years of education: Not on file   • Highest education level: Not on file   Occupational History   • Occupation: Civil    Social Needs   • Financial resource strain: Not hard at all   • Food insecurity:     Worry: Never true     Inability: Never true   • Transportation needs:     Medical: No     Non-medical: No   Tobacco Use   • Smoking status: Former Smoker     Packs/day: 0.50     Years: 18.00     Pack years: 9.00     Types: Cigarettes   •  "Smokeless tobacco: Never Used   • Tobacco comment: Stopped smoking at age 41.   Substance and Sexual Activity   • Alcohol use: Yes     Frequency: 2-3 times a week     Drinks per session: 1 or 2     Binge frequency: Never     Comment: SOCIALLY   • Drug use: No   • Sexual activity: Yes     Partners: Female   Lifestyle   • Physical activity:     Days per week: 7 days     Minutes per session: 60 min   • Stress: Only a little   Relationships   • Social connections:     Talks on phone: Patient refused     Gets together: Patient refused     Attends Orthodoxy service: Patient refused     Active member of club or organization: Patient refused     Attends meetings of clubs or organizations: Patient refused     Relationship status: Patient refused         Vitals:    02/13/20 0921 02/13/20 0941   BP: 150/86 156/94   BP Location: Left arm Right arm   Patient Position:  Sitting   Cuff Size:  Adult   Pulse: 67    SpO2: 98%    Weight: 86.5 kg (190 lb 9.6 oz)    Height: 177.8 cm (70\")         Body mass index is 27.35 kg/m².      Physical Exam:    Brief general physical examination is unremarkable.    Lab/other results:      Assessment/Plan:     Diagnosis Plan   1. Benign essential hypertension     2. White coat syndrome with diagnosis of hypertension       Patient with hypertension that has been very difficult to control over many many years.  It seems his blood pressure is always under good control at home but when he comes here to the office it is elevated.  We have had times where I have controlled his blood pressure too well and his blood pressure look good here in the office and then when he gets home he has marked fatigue and tiredness with a low blood pressure.  He must truly have white coat syndrome.    Plan is as follows: We will not make any changes to his current blood pressure medication.  Patient has been monitoring his blood pressure at home and it looks excellent.  The only time his blood pressure is elevated is when " he is here in the office and therefore the likelihood of whitecoat hypertension must be real.  We will set up fasting lab work and annual physical after June 1, 2020 or follow-up as needed.        Procedures

## 2020-05-22 DIAGNOSIS — E55.9 VITAMIN D DEFICIENCY: Chronic | ICD-10-CM

## 2020-05-22 DIAGNOSIS — E78.5 HYPERLIPIDEMIA, UNSPECIFIED HYPERLIPIDEMIA TYPE: Chronic | ICD-10-CM

## 2020-05-22 DIAGNOSIS — Z00.00 ROUTINE PHYSICAL EXAMINATION: ICD-10-CM

## 2020-05-22 DIAGNOSIS — R73.01 IMPAIRED FASTING GLUCOSE: Chronic | ICD-10-CM

## 2020-05-27 ENCOUNTER — APPOINTMENT (OUTPATIENT)
Dept: LAB | Facility: HOSPITAL | Age: 65
End: 2020-05-27

## 2020-05-27 LAB
25(OH)D3 SERPL-MCNC: 34 NG/ML (ref 30–100)
ALBUMIN SERPL-MCNC: 4 G/DL (ref 3.5–5.2)
ALBUMIN/GLOB SERPL: 1.6 G/DL
ALP SERPL-CCNC: 44 U/L (ref 39–117)
ALT SERPL W P-5'-P-CCNC: 17 U/L (ref 1–41)
ANION GAP SERPL CALCULATED.3IONS-SCNC: 8.5 MMOL/L (ref 5–15)
AST SERPL-CCNC: 21 U/L (ref 1–40)
BILIRUB SERPL-MCNC: 0.5 MG/DL (ref 0.2–1.2)
BILIRUB UR QL STRIP: NEGATIVE
BUN BLD-MCNC: 14 MG/DL (ref 8–23)
BUN/CREAT SERPL: 17.9 (ref 7–25)
CALCIUM SPEC-SCNC: 8.8 MG/DL (ref 8.6–10.5)
CHLORIDE SERPL-SCNC: 103 MMOL/L (ref 98–107)
CLARITY UR: CLEAR
CO2 SERPL-SCNC: 24.5 MMOL/L (ref 22–29)
COLOR UR: YELLOW
CREAT BLD-MCNC: 0.78 MG/DL (ref 0.76–1.27)
DEPRECATED RDW RBC AUTO: 44 FL (ref 37–54)
ERYTHROCYTE [DISTWIDTH] IN BLOOD BY AUTOMATED COUNT: 13.3 % (ref 12.3–15.4)
GFR SERPL CREATININE-BSD FRML MDRD: 101 ML/MIN/1.73
GLOBULIN UR ELPH-MCNC: 2.5 GM/DL
GLUCOSE BLD-MCNC: 122 MG/DL (ref 65–99)
GLUCOSE UR STRIP-MCNC: NEGATIVE MG/DL
HBA1C MFR BLD: 5.69 % (ref 4.8–5.6)
HCT VFR BLD AUTO: 40.7 % (ref 37.5–51)
HGB BLD-MCNC: 14.1 G/DL (ref 13–17.7)
HGB UR QL STRIP.AUTO: NEGATIVE
KETONES UR QL STRIP: NEGATIVE
LEUKOCYTE ESTERASE UR QL STRIP.AUTO: NEGATIVE
MCH RBC QN AUTO: 31.7 PG (ref 26.6–33)
MCHC RBC AUTO-ENTMCNC: 34.6 G/DL (ref 31.5–35.7)
MCV RBC AUTO: 91.5 FL (ref 79–97)
NITRITE UR QL STRIP: NEGATIVE
PH UR STRIP.AUTO: 7 [PH] (ref 5–8)
PLATELET # BLD AUTO: 200 10*3/MM3 (ref 140–450)
PMV BLD AUTO: 10.2 FL (ref 6–12)
POTASSIUM BLD-SCNC: 4.2 MMOL/L (ref 3.5–5.2)
PROT SERPL-MCNC: 6.5 G/DL (ref 6–8.5)
PROT UR QL STRIP: NEGATIVE
PSA SERPL-MCNC: 3.71 NG/ML (ref 0–4)
RBC # BLD AUTO: 4.45 10*6/MM3 (ref 4.14–5.8)
SODIUM BLD-SCNC: 136 MMOL/L (ref 136–145)
SP GR UR STRIP: 1.01 (ref 1–1.03)
T3FREE SERPL-MCNC: 2.92 PG/ML (ref 2–4.4)
T4 FREE SERPL-MCNC: 1.05 NG/DL (ref 0.93–1.7)
TSH SERPL DL<=0.05 MIU/L-ACNC: 3.07 UIU/ML (ref 0.27–4.2)
UROBILINOGEN UR QL STRIP: NORMAL
WBC NRBC COR # BLD: 5.02 10*3/MM3 (ref 3.4–10.8)

## 2020-05-27 PROCEDURE — 82306 VITAMIN D 25 HYDROXY: CPT | Performed by: INTERNAL MEDICINE

## 2020-05-27 PROCEDURE — 80053 COMPREHEN METABOLIC PANEL: CPT | Performed by: INTERNAL MEDICINE

## 2020-05-27 PROCEDURE — 84481 FREE ASSAY (FT-3): CPT | Performed by: INTERNAL MEDICINE

## 2020-05-27 PROCEDURE — 85027 COMPLETE CBC AUTOMATED: CPT | Performed by: INTERNAL MEDICINE

## 2020-05-27 PROCEDURE — 84443 ASSAY THYROID STIM HORMONE: CPT | Performed by: INTERNAL MEDICINE

## 2020-05-27 PROCEDURE — 81003 URINALYSIS AUTO W/O SCOPE: CPT | Performed by: INTERNAL MEDICINE

## 2020-05-27 PROCEDURE — 80061 LIPID PANEL: CPT | Performed by: INTERNAL MEDICINE

## 2020-05-27 PROCEDURE — 84439 ASSAY OF FREE THYROXINE: CPT | Performed by: INTERNAL MEDICINE

## 2020-05-27 PROCEDURE — 84153 ASSAY OF PSA TOTAL: CPT | Performed by: INTERNAL MEDICINE

## 2020-05-27 PROCEDURE — 36415 COLL VENOUS BLD VENIPUNCTURE: CPT

## 2020-05-27 PROCEDURE — 83704 LIPOPROTEIN BLD QUAN PART: CPT | Performed by: INTERNAL MEDICINE

## 2020-05-27 PROCEDURE — 83036 HEMOGLOBIN GLYCOSYLATED A1C: CPT | Performed by: INTERNAL MEDICINE

## 2020-05-28 LAB
CHOLEST SERPL-MCNC: 170 MG/DL (ref 100–199)
HDL SERPL-SCNC: 44.1 UMOL/L
HDLC SERPL-MCNC: 66 MG/DL
LDL-P: 1091 NMOL/L
LDLC REAL SIZE PAT SERPL: 21.4 NM
LDLC SERPL CALC-MCNC: 91 MG/DL (ref 0–99)
SMALL LDL-P: 511 NMOL/L
TRIGL SERPL-MCNC: 67 MG/DL (ref 0–149)

## 2020-06-01 DIAGNOSIS — I10 BENIGN ESSENTIAL HYPERTENSION: Chronic | ICD-10-CM

## 2020-06-01 RX ORDER — AMLODIPINE BESYLATE 5 MG/1
TABLET ORAL
Qty: 30 TABLET | Refills: 2 | Status: SHIPPED | OUTPATIENT
Start: 2020-06-01 | End: 2020-06-09 | Stop reason: SDUPTHER

## 2020-06-03 ENCOUNTER — OFFICE VISIT (OUTPATIENT)
Dept: INTERNAL MEDICINE | Facility: CLINIC | Age: 65
End: 2020-06-03

## 2020-06-03 VITALS
SYSTOLIC BLOOD PRESSURE: 138 MMHG | HEART RATE: 69 BPM | HEIGHT: 70 IN | BODY MASS INDEX: 27.63 KG/M2 | OXYGEN SATURATION: 99 % | WEIGHT: 193 LBS | DIASTOLIC BLOOD PRESSURE: 90 MMHG

## 2020-06-03 DIAGNOSIS — Z80.0 FAMILY HISTORY OF COLON CANCER: Chronic | ICD-10-CM

## 2020-06-03 DIAGNOSIS — R73.01 IMPAIRED FASTING GLUCOSE: Chronic | ICD-10-CM

## 2020-06-03 DIAGNOSIS — E55.9 VITAMIN D DEFICIENCY: Chronic | ICD-10-CM

## 2020-06-03 DIAGNOSIS — Z78.9 STATIN INTOLERANCE: Chronic | ICD-10-CM

## 2020-06-03 DIAGNOSIS — E78.2 MIXED HYPERLIPIDEMIA: Chronic | ICD-10-CM

## 2020-06-03 DIAGNOSIS — Z51.81 THERAPEUTIC DRUG MONITORING: ICD-10-CM

## 2020-06-03 DIAGNOSIS — I10 WHITE COAT SYNDROME WITH DIAGNOSIS OF HYPERTENSION: ICD-10-CM

## 2020-06-03 DIAGNOSIS — Z00.00 ROUTINE PHYSICAL EXAMINATION: Primary | ICD-10-CM

## 2020-06-03 DIAGNOSIS — R97.20 RISING PSA LEVEL: ICD-10-CM

## 2020-06-03 DIAGNOSIS — E66.9 NON MORBID OBESITY: Chronic | ICD-10-CM

## 2020-06-03 DIAGNOSIS — I10 BENIGN ESSENTIAL HYPERTENSION: Chronic | ICD-10-CM

## 2020-06-03 PROCEDURE — 99396 PREV VISIT EST AGE 40-64: CPT | Performed by: INTERNAL MEDICINE

## 2020-06-03 NOTE — PROGRESS NOTES
06/03/2020    Patient Information  George Chavez                                                                                          [id number] [facility] [address]      1955  [unfilled]  There is no work phone number on file.    Chief Complaint:     Routine annual physical examination and follow-up lab work.  No acute complaints.    History of Present Illness:    Patient with a history of hypertension and whitecoat syndrome, hyperlipidemia, impaired fasting glucose, family history of colon cancer, statin intolerance, nonmorbid obesity, rising PSA level.  He presents today for his routine annual physical exam and follow-up lab work in order to monitor his chronic medical issues.  His past medical history reviewed and updated were necessary including health maintenance parameters.  This reveals he will be up-to-date or else accounted for after today's visit.    Review of Systems   Constitution: Negative.   HENT: Negative.    Eyes: Negative.    Cardiovascular: Negative.    Respiratory: Negative.    Endocrine: Negative.    Hematologic/Lymphatic: Negative.    Skin: Negative.    Musculoskeletal: Positive for arthritis and joint pain.        Left shoulder   Gastrointestinal: Negative.    Genitourinary: Negative.    Neurological: Negative.    Psychiatric/Behavioral: Negative.    Allergic/Immunologic: Negative.        Active Problems:    Patient Active Problem List   Diagnosis   • Benign essential hypertension   • Lumbar scoliosis   • Hyperlipidemia   • Impaired fasting glucose   • Vitamin D deficiency   • Therapeutic drug monitoring   • Routine physical examination   • Family history of colon cancer   • Myalgia, secondary Lipitor   • Multiple actinic keratoses   • Statin intolerance   • Rising PSA level   • Non morbid obesity   • White coat syndrome with diagnosis of hypertension         Past Medical History:   Diagnosis Date   • Benign essential hypertension 2/15/2016     01/28/2004--treatment for hypertension begun.   • Family history of colon cancer 2/16/2016    Maternal grandfather had colon cancer at age 86.   • History of Palpitations 07/12/2010 07/12/2010--24-hour Holter monitor revealed several PVCs and fewer PACs. No significant arrhythmia. Stress echocardiogram 06/06/2010 normal.   • Hyperlipidemia 2/15/2016    12/27/2010--treatment for hyperlipidemia begun.   • Impaired fasting glucose 2/15/2016    11/01/2007--initial diagnosis.   • Lumbar scoliosis 2/15/2016    09/23/2011--MRI lumbar spine revealed mild to moderate degenerative arthritis, dextro scoliosis with the apex centered around L3 and L4.   • Multiple actinic keratoses 3/23/2017    03/23/2017--1 actinic keratosis on the left forearm, 2 actinic keratoses on the right forearm, were destroyed with liquid nitrogen today.   • Myalgia, secondary Lipitor 3/23/2017    05/04/2017--patient seen in follow-up and reports return of myalgias after changing to 10 mg of Crestor daily.  He questions whether or not 5 mg per day might do the job without myalgias.  After reviewing his NMR profile which was excellent, I think this would be a good idea.  He continues to have myalgias related to statin therapy, another consideration would be Zetia.  03/23/2017--patient seen in follow-up and reports his myalgias have essentially resolved after discontinuation of Lipitor.  We will try generic Crestor 10 mg per day.  Reassess with lab in about 6 weeks.  03/02/2017--patient reports muscular aches and pains, particularly lower extremities possibly related to Lipitor, despite taking coenzyme Q 10 300 mg per day.  Lipitor discontinued.   • Non morbid obesity 7/30/2019   • Statin intolerance 1/30/2018   • Vitamin D deficiency 2/15/2016         Past Surgical History:   Procedure Laterality Date   • COLONOSCOPY  04/11/2011 04/11/2011--normal colonoscopy.    • COLONOSCOPY  12/10/2005    12/10/2005--normal colonoscopy.   • COLONOSCOPY N/A  7/13/2016 07/13/2016--normal colonoscopy to the cecum with a good prep.   • JOINT MANIPULATION Left 8/16/2018    Procedure: LT SHOLDER MANIPULATION;  Surgeon: Mo Lemus MD;  [place] [facility] [place] [facility] [place]  Service: [place]opedics   • LAPAROSCOPIC CHOLECYSTECTOMY  08/22/2011 08/22/2011--laparoscopic cholecystectomy         No Known Allergies        Current Outpatient Medications:   •  amLODIPine (NORVASC) 5 MG tablet, TAKE ONE TABLET BY MOUTH DAILY FOR HIGH BLOOD PRESSURE AS DIRECTED, Disp: 30 tablet, Rfl: 2  •  aspirin 81 MG EC tablet, Take 81 mg by mouth Daily., Disp: , Rfl:   •  Cholecalciferol (VITAMIN D) 2000 units capsule, Take 2,000 Units by mouth Daily., Disp: , Rfl:   •  ezetimibe (ZETIA) 10 MG tablet, TAKE 1 TABLET EVERY EVENING, Disp: 90 tablet, Rfl: 4  •  Multiple Vitamin (MULTIVITAMINS PO), Take by mouth daily., Disp: , Rfl:   •  olmesartan-hydrochlorothiazide (BENICAR HCT) 40-12.5 MG per tablet, TAKE 1 TABLET DAILY, Disp: 90 tablet, Rfl: 4  •  TOPROL XL 25 MG 24 hr tablet, TAKE 1 TABLET DAILY, Disp: 90 tablet, Rfl: 4      Family History   Problem Relation Age of Onset   • Heart attack Mother         Mother had a Myocardial Infarction at age 81. Status post PTCA with stents.   • Heart attack Father         Father had a myocardial infarction at age 60.   • Cancer Maternal Grandfather         MGF was diagnosed with colon cancer at age 86   • Malig Hyperthermia Neg Hx          Social History     Socioeconomic History   • Marital status:      Spouse name: Not on file   • Number of children: Not on file   • Years of education: Not on file   • Highest education level: Not on file   Occupational History   • Occupation: Civil    Social Needs   • Financial resource strain: Not hard at all   • Food insecurity:     Worry: Never true     Inability: Never true   • Transportation needs:     Medical: No     Non-medical: No   Tobacco Use   • Smoking status: Former Smoker     Packs/day: 0.50      "Years: 18.00     Pack years: 9.00     Types: Cigarettes   • Smokeless tobacco: Never Used   • Tobacco comment: Stopped smoking at age 41.   Substance and Sexual Activity   • Alcohol use: Yes     Frequency: 2-3 times a week     Drinks per session: 1 or 2     Binge frequency: Never     Comment: SOCIALLY   • Drug use: No   • Sexual activity: Yes     Partners: Female   Lifestyle   • Physical activity:     Days per week: 7 days     Minutes per session: 60 min   • Stress: Only a little   Relationships   • Social connections:     Talks on phone: Patient refused     Gets together: Patient refused     Attends Church service: Patient refused     Active member of club or organization: Patient refused     Attends meetings of clubs or organizations: Patient refused     Relationship status: Patient refused         Vitals:    06/03/20 0852   BP: 138/90   BP Location: Left arm   Pulse: 69   SpO2: 99%   Weight: 87.5 kg (193 lb)   Height: 177.8 cm (70\")        Body mass index is 27.69 kg/m².      Physical Exam:    General: Alert and oriented x 3.  No acute distress.  Normal affect.  Slightly overweight.  HEENT: Pupils equal, round, reactive to light; extraocular movements intact; sclerae nonicteric; pharynx, ear canals and TMs normal.  Neck: Without JVD, thyromegaly, bruit, or adenopathy.  Lungs: Clear to auscultation in all fields.  Heart: Regular rate and rhythm without murmur, rub, gallop, or click.  Abdomen: Soft, nontender, without hepatosplenomegaly or hernia.  Bowel sounds normal.  : Deferred.  Rectal: Deferred.  Extremities: Without clubbing, cyanosis, edema, or pulse deficit.  Neurologic: Intact without focal deficit.  Normal station and gait observed during ingress and egress from the examination room.  Skin: Without significant lesion.  Musculoskeletal: Unremarkable.    Lab/other results:    NMR reveals total cholesterol 170.  Triglycerides 67.  LDL particle number just slightly elevated 1091.  Small LDL particle " number excellent at 511.  HDL particle number very good at 44.1.  CBC normal.  CMP normal except glucose 122.  Hemoglobin A1c 5.69.  Vitamin D normal.  Urinalysis normal.  Thyroid function test normal.  PSA 3.71.    Assessment/Plan:     Diagnosis Plan   1. Routine physical examination     2. Impaired fasting glucose  Comprehensive Metabolic Panel    Hemoglobin A1c   3. Hyperlipidemia  NMR LipoProfile    TSH    T4, Free    T3, Free   4. Benign essential hypertension     5. Family history of colon cancer     6. Statin intolerance     7. White coat syndrome with diagnosis of hypertension     8. Vitamin D deficiency     9. Non morbid obesity     10. Rising PSA level  PSA DIAGNOSTIC   11. Therapeutic drug monitoring       Patient presents with essentially normal annual physical except for the following issues:    He has mild impaired fasting glucose that does not require medication.  Patient has lost some weight and I encouraged him to continue to do so.  Hyperlipidemia is under good control with Zetia.  His blood pressure is elevated today but he does have whitecoat hypertension and it is always elevated in the doctor's office.  He monitors at home with good readings most of the time.  He has a family history of colon cancer and is up-to-date on his colonoscopy.  Patient does have statin intolerance but does well with Zetia.  Vitamin D in the normal range.  His obesity is improving.  Patient does have a rising PSA which we will monitor closely.    Several preventative health issues discussed including review of vaccinations and recommendations, including dietary issues, exercise and weight loss.  Safe sex practices discussed.  Patient advised to wear seatbelt whenever driving and avoid texting and driving.  Also advised to look both ways before crossing the street.  Colon cancer prevention discussed and is up-to-date with colonoscopy.  Advised to avoid tobacco products and minimize alcohol consumption.    Plan is as  follows: No change in current medical regimen.  Patient will continue to work on diet, exercise, and weight loss.  I will have him follow-up in 6 months with lab prior or follow-up as needed.    Procedures

## 2020-06-09 DIAGNOSIS — I10 BENIGN ESSENTIAL HYPERTENSION: Chronic | ICD-10-CM

## 2020-06-09 RX ORDER — AMLODIPINE BESYLATE 5 MG/1
TABLET ORAL
Qty: 30 TABLET | Refills: 2 | Status: SHIPPED | OUTPATIENT
Start: 2020-06-09 | End: 2020-06-10 | Stop reason: SDUPTHER

## 2020-06-10 DIAGNOSIS — I10 BENIGN ESSENTIAL HYPERTENSION: Chronic | ICD-10-CM

## 2020-06-10 RX ORDER — AMLODIPINE BESYLATE 5 MG/1
TABLET ORAL
Qty: 90 TABLET | Refills: 2 | Status: SHIPPED | OUTPATIENT
Start: 2020-06-10 | End: 2021-05-13

## 2020-11-04 DIAGNOSIS — R97.20 RISING PSA LEVEL: ICD-10-CM

## 2020-11-04 DIAGNOSIS — R73.01 IMPAIRED FASTING GLUCOSE: Chronic | ICD-10-CM

## 2020-11-04 DIAGNOSIS — E78.2 MIXED HYPERLIPIDEMIA: Chronic | ICD-10-CM

## 2020-12-03 ENCOUNTER — LAB (OUTPATIENT)
Dept: LAB | Facility: HOSPITAL | Age: 65
End: 2020-12-03

## 2020-12-03 LAB
ALBUMIN SERPL-MCNC: 4 G/DL (ref 3.5–5.2)
ALBUMIN/GLOB SERPL: 1.4 G/DL
ALP SERPL-CCNC: 49 U/L (ref 39–117)
ALT SERPL W P-5'-P-CCNC: 17 U/L (ref 1–41)
ANION GAP SERPL CALCULATED.3IONS-SCNC: 10.5 MMOL/L (ref 5–15)
AST SERPL-CCNC: 20 U/L (ref 1–40)
BILIRUB SERPL-MCNC: 0.4 MG/DL (ref 0–1.2)
BUN SERPL-MCNC: 17 MG/DL (ref 8–23)
BUN/CREAT SERPL: 19.8 (ref 7–25)
CALCIUM SPEC-SCNC: 9.3 MG/DL (ref 8.6–10.5)
CHLORIDE SERPL-SCNC: 107 MMOL/L (ref 98–107)
CO2 SERPL-SCNC: 23.5 MMOL/L (ref 22–29)
CREAT SERPL-MCNC: 0.86 MG/DL (ref 0.76–1.27)
GFR SERPL CREATININE-BSD FRML MDRD: 90 ML/MIN/1.73
GLOBULIN UR ELPH-MCNC: 2.8 GM/DL
GLUCOSE SERPL-MCNC: 118 MG/DL (ref 65–99)
HBA1C MFR BLD: 5.78 % (ref 4.8–5.6)
POTASSIUM SERPL-SCNC: 4.1 MMOL/L (ref 3.5–5.2)
PROT SERPL-MCNC: 6.8 G/DL (ref 6–8.5)
PSA SERPL-MCNC: 3.66 NG/ML (ref 0–4)
SODIUM SERPL-SCNC: 141 MMOL/L (ref 136–145)
T3FREE SERPL-MCNC: 3.23 PG/ML (ref 2–4.4)
T4 FREE SERPL-MCNC: 1.21 NG/DL (ref 0.93–1.7)
TSH SERPL DL<=0.05 MIU/L-ACNC: 3.69 UIU/ML (ref 0.27–4.2)

## 2020-12-03 PROCEDURE — 84481 FREE ASSAY (FT-3): CPT | Performed by: INTERNAL MEDICINE

## 2020-12-03 PROCEDURE — 84439 ASSAY OF FREE THYROXINE: CPT | Performed by: INTERNAL MEDICINE

## 2020-12-03 PROCEDURE — 80053 COMPREHEN METABOLIC PANEL: CPT | Performed by: INTERNAL MEDICINE

## 2020-12-03 PROCEDURE — 36415 COLL VENOUS BLD VENIPUNCTURE: CPT

## 2020-12-03 PROCEDURE — 84153 ASSAY OF PSA TOTAL: CPT | Performed by: INTERNAL MEDICINE

## 2020-12-03 PROCEDURE — 83036 HEMOGLOBIN GLYCOSYLATED A1C: CPT | Performed by: INTERNAL MEDICINE

## 2020-12-03 PROCEDURE — 80061 LIPID PANEL: CPT | Performed by: INTERNAL MEDICINE

## 2020-12-03 PROCEDURE — 83704 LIPOPROTEIN BLD QUAN PART: CPT | Performed by: INTERNAL MEDICINE

## 2020-12-03 PROCEDURE — 84443 ASSAY THYROID STIM HORMONE: CPT | Performed by: INTERNAL MEDICINE

## 2020-12-05 LAB
CHOLEST SERPL-MCNC: 177 MG/DL (ref 100–199)
HDL SERPL-SCNC: 40.2 UMOL/L
HDLC SERPL-MCNC: 68 MG/DL
LDL SERPL QN: 21 NM
LDL SERPL-SCNC: 1234 NMOL/L
LDL SMALL SERPL-SCNC: 584 NMOL/L
LDLC SERPL CALC-MCNC: 96 MG/DL (ref 0–99)
TRIGL SERPL-MCNC: 70 MG/DL (ref 0–149)

## 2020-12-10 ENCOUNTER — OFFICE VISIT (OUTPATIENT)
Dept: INTERNAL MEDICINE | Facility: CLINIC | Age: 65
End: 2020-12-10

## 2020-12-10 VITALS
WEIGHT: 196.2 LBS | SYSTOLIC BLOOD PRESSURE: 150 MMHG | DIASTOLIC BLOOD PRESSURE: 88 MMHG | HEART RATE: 75 BPM | OXYGEN SATURATION: 99 % | HEIGHT: 70 IN | BODY MASS INDEX: 28.09 KG/M2

## 2020-12-10 DIAGNOSIS — I10 BENIGN ESSENTIAL HYPERTENSION: Chronic | ICD-10-CM

## 2020-12-10 DIAGNOSIS — Z80.0 FAMILY HISTORY OF COLON CANCER: Chronic | ICD-10-CM

## 2020-12-10 DIAGNOSIS — R97.20 RISING PSA LEVEL: ICD-10-CM

## 2020-12-10 DIAGNOSIS — R73.01 IMPAIRED FASTING GLUCOSE: Primary | Chronic | ICD-10-CM

## 2020-12-10 DIAGNOSIS — M79.10 MYALGIA: Chronic | ICD-10-CM

## 2020-12-10 DIAGNOSIS — Z78.9 STATIN INTOLERANCE: Chronic | ICD-10-CM

## 2020-12-10 DIAGNOSIS — E66.9 NON MORBID OBESITY: Chronic | ICD-10-CM

## 2020-12-10 DIAGNOSIS — Z00.00 ROUTINE PHYSICAL EXAMINATION: ICD-10-CM

## 2020-12-10 DIAGNOSIS — E55.9 VITAMIN D DEFICIENCY: Chronic | ICD-10-CM

## 2020-12-10 DIAGNOSIS — E78.2 MIXED HYPERLIPIDEMIA: Chronic | ICD-10-CM

## 2020-12-10 DIAGNOSIS — Z51.81 THERAPEUTIC DRUG MONITORING: ICD-10-CM

## 2020-12-10 DIAGNOSIS — I10 WHITE COAT SYNDROME WITH DIAGNOSIS OF HYPERTENSION: ICD-10-CM

## 2020-12-10 PROCEDURE — 99214 OFFICE O/P EST MOD 30 MIN: CPT | Performed by: INTERNAL MEDICINE

## 2020-12-10 RX ORDER — OLMESARTAN MEDOXOMIL AND HYDROCHLOROTHIAZIDE 40/12.5 40; 12.5 MG/1; MG/1
TABLET ORAL
Qty: 90 TABLET | Refills: 3 | Status: SHIPPED | OUTPATIENT
Start: 2020-12-10 | End: 2021-12-14

## 2020-12-10 NOTE — PROGRESS NOTES
12/10/2020    Patient Information  George Chavez                                                                                          [address] [facility] [address]      1955  [unfilled]  There is no work phone number on file.    Chief Complaint:     Follow-up lab work in order to monitor chronic medical issues listed in history of present illness.  No new acute complaints.    History of Present Illness:    Patient with a history of impaired fasting glucose, hyperlipidemia, statin intolerance due to myalgias, hypertension, white coat syndrome, vitamin D deficiency, family history of colon cancer, nonmorbid obesity, rising PSA level.  He presents today for a follow-up with lab prior in order to monitor his chronic medical issues.  His past medical history reviewed and updated were necessary including health maintenance parameters.  This reveals he is up-to-date with the exception of the new Shingrix vaccine which I have encouraged him to check with his insurance company regarding coverage.    Review of Systems   Constitution: Negative.   HENT: Negative.    Eyes: Negative.    Cardiovascular: Negative.    Respiratory: Negative.    Endocrine: Negative.    Hematologic/Lymphatic: Negative.    Skin: Negative.    Musculoskeletal: Negative.    Gastrointestinal: Negative.    Genitourinary: Negative.    Neurological: Negative.    Psychiatric/Behavioral: Negative.    Allergic/Immunologic: Negative.        Active Problems:    Patient Active Problem List   Diagnosis   • Benign essential hypertension   • Lumbar scoliosis   • Hyperlipidemia   • Impaired fasting glucose   • Vitamin D deficiency   • Therapeutic drug monitoring   • Routine physical examination   • Family history of colon cancer   • Myalgia, secondary Lipitor   • Multiple actinic keratoses   • Statin intolerance   • Rising PSA level   • Non morbid obesity   • White coat syndrome with diagnosis of hypertension         Past Medical  History:   Diagnosis Date   • Benign essential hypertension 2/15/2016    01/28/2004--treatment for hypertension begun.   • Family history of colon cancer 2/16/2016    Maternal grandfather had colon cancer at age 86.   • History of Palpitations 07/12/2010 07/12/2010--24-hour Holter monitor revealed several PVCs and fewer PACs. No significant arrhythmia. Stress echocardiogram 06/06/2010 normal.   • Hyperlipidemia 2/15/2016    12/27/2010--treatment for hyperlipidemia begun.   • Impaired fasting glucose 2/15/2016    11/01/2007--initial diagnosis.   • Lumbar scoliosis 2/15/2016    09/23/2011--MRI lumbar spine revealed mild to moderate degenerative arthritis, dextro scoliosis with the apex centered around L3 and L4.   • Multiple actinic keratoses 3/23/2017    03/23/2017--1 actinic keratosis on the left forearm, 2 actinic keratoses on the right forearm, were destroyed with liquid nitrogen today.   • Myalgia, secondary Lipitor 3/23/2017    05/04/2017--patient seen in follow-up and reports return of myalgias after changing to 10 mg of Crestor daily.  He questions whether or not 5 mg per day might do the job without myalgias.  After reviewing his NMR profile which was excellent, I think this would be a good idea.  He continues to have myalgias related to statin therapy, another consideration would be Zetia.  03/23/2017--patient seen in follow-up and reports his myalgias have essentially resolved after discontinuation of Lipitor.  We will try generic Crestor 10 mg per day.  Reassess with lab in about 6 weeks.  03/02/2017--patient reports muscular aches and pains, particularly lower extremities possibly related to Lipitor, despite taking coenzyme Q 10 300 mg per day.  Lipitor discontinued.   • Non morbid obesity 7/30/2019   • Statin intolerance 1/30/2018   • Vitamin D deficiency 2/15/2016   • White coat syndrome with diagnosis of hypertension 2/13/2020         Past Surgical History:   Procedure Laterality Date   •  COLONOSCOPY  04/11/2011 04/11/2011--normal colonoscopy.    • COLONOSCOPY  12/10/2005    12/10/2005--normal colonoscopy.   • COLONOSCOPY N/A 7/13/2016 07/13/2016--normal colonoscopy to the cecum with a good prep.   • JOINT MANIPULATION Left 8/16/2018    Procedure: LT SHOLDER MANIPULATION;  Surgeon: Mo Lemus MD;  [place] [facility] [place] [facility] [place];  Service: Orthopedics   • LAPAROSCOPIC CHOLECYSTECTOMY  08/22/2011 08/22/2011--laparoscopic cholecystectomy         No Known Allergies        Current Outpatient Medications:   •  amLODIPine (NORVASC) 5 MG tablet, TAKE ONE TABLET BY MOUTH DAILY FOR HIGH BLOOD PRESSURE AS DIRECTED, Disp: 90 tablet, Rfl: 2  •  aspirin 81 MG EC tablet, Take 81 mg by mouth Daily., Disp: , Rfl:   •  Cholecalciferol (VITAMIN D) 2000 units capsule, Take 2,000 Units by mouth Daily., Disp: , Rfl:   •  ezetimibe (ZETIA) 10 MG tablet, TAKE 1 TABLET EVERY EVENING, Disp: 90 tablet, Rfl: 4  •  Multiple Vitamin (MULTIVITAMINS PO), Take by mouth daily., Disp: , Rfl:   •  olmesartan-hydrochlorothiazide (BENICAR HCT) 40-12.5 MG per tablet, TAKE 1 TABLET DAILY, Disp: 90 tablet, Rfl: 3  •  TOPROL XL 25 MG 24 hr tablet, TAKE 1 TABLET DAILY, Disp: 90 tablet, Rfl: 4      Family History   Problem Relation Age of Onset   • Heart attack Mother         Mother had a Myocardial Infarction at age 81. Status post PTCA with stents.   • Heart attack Father         Father had a myocardial infarction at age 60.   • Cancer Maternal Grandfather         MGF was diagnosed with colon cancer at age 86   • Malig Hyperthermia Neg Hx          Social History     Socioeconomic History   • Marital status:      Spouse name: Not on file   • Number of children: Not on file   • Years of education: Not on file   • Highest education level: Not on file   Occupational History   • Occupation: Civil    Social Needs   • Financial resource strain: Not hard at all   • Food insecurity     Worry: Never true     Inability:  "Never true   • Transportation needs     Medical: No     Non-medical: No   Tobacco Use   • Smoking status: Former Smoker     Packs/day: 0.50     Years: 18.00     Pack years: 9.00     Types: Cigarettes   • Smokeless tobacco: Never Used   • Tobacco comment: Stopped smoking at age 41.   Substance and Sexual Activity   • Alcohol use: Yes     Frequency: 2-3 times a week     Drinks per session: 1 or 2     Binge frequency: Never     Comment: SOCIALLY   • Drug use: No   • Sexual activity: Yes     Partners: Female   Lifestyle   • Physical activity     Days per week: 7 days     Minutes per session: 60 min   • Stress: Only a little   Relationships   • Social connections     Talks on phone: Patient refused     Gets together: Patient refused     Attends [employer] service: Patient refused     Active member of club or organization: Patient refused     Attends meetings of clubs or organizations: Patient refused     Relationship status: Patient refused         Vitals:    12/10/20 0906   BP: 150/88   BP Location: Left arm   Pulse: 75   SpO2: 99%   Weight: 89 kg (196 lb 3.2 oz)   Height: 177.8 cm (70\")        Body mass index is 28.15 kg/m².      Physical Exam:    General: Alert and oriented x 3.  No acute distress.  Normal affect.  Overweight.  HEENT: Pupils equal, round, reactive to light; extraocular movements intact; sclerae nonicteric; pharynx, ear canals and TMs normal.  Neck: Without JVD, thyromegaly, bruit, or adenopathy.  Lungs: Clear to auscultation in all fields.  Heart: Regular rate and rhythm without murmur, rub, gallop, or click.  Abdomen: Soft, nontender, without hepatosplenomegaly or hernia.  Bowel sounds normal.  : Deferred.  Rectal: Deferred.  Extremities: Without clubbing, cyanosis, edema, or pulse deficit.  Neurologic: Intact without focal deficit.  Normal station and gait observed during ingress and egress from the examination room.  Skin: Without significant lesion.  Musculoskeletal: Unremarkable.    Lab/other " results:    NMR reveals a total cholesterol of 177.  Triglycerides 70.  LDL particle number slightly elevated at 1234, small LDL elevated at 584.  HDL particle number excellent at 40.2.  CMP normal except glucose 118.  PSA normal at 3.66.  Thyroid function test normal.  Hemoglobin A1c 5.78.    Assessment/Plan:     Diagnosis Plan   1. Impaired fasting glucose  Hemoglobin A1c   2. Hyperlipidemia  Comprehensive Metabolic Panel    NMR LipoProfile    TSH    T4, Free    T3, Free   3. Statin intolerance     4. Myalgia, secondary Lipitor     5. Benign essential hypertension     6. White coat syndrome with diagnosis of hypertension     7. Vitamin D deficiency     8. Family history of colon cancer     9. Non morbid obesity     10. Rising PSA level  Urinalysis With Microscopic If Indicated (No Culture) - Urine, Clean Catch    PSA DIAGNOSTIC   11. Therapeutic drug monitoring  CBC (No Diff)    Urinalysis With Microscopic If Indicated (No Culture) - Urine, Clean Catch   12. Routine physical examination  CBC (No Diff)    Comprehensive Metabolic Panel    Hemoglobin A1c    NMR LipoProfile    Urinalysis With Microscopic If Indicated (No Culture) - Urine, Clean Catch    TSH    T4, Free    T3, Free    PSA DIAGNOSTIC     Patient has mild impaired fasting glucose that does not require medication.  Strongly recommend low carbohydrate diet, exercise, and weight loss.  His cholesterol is under good control with Zetia.  Patient is statin intolerant.  His blood pressure is up today which is not unusual.  Patient has documented whitecoat syndrome.  He checks his blood pressure at home on a regular basis and it seems to be reasonably controlled.  Vitamin D in the normal range.  Patient has a family history of colon cancer and is up-to-date on his colon screening.  He does have a problem with his weight and I think that would help his blood pressure as well as cholesterol status.  He has a rising PSA which seems to be stable as compared to the  PSA of 3.716 months ago.  We will monitor closely.    Plan is as follows: Strongly recommend low carbohydrate diet, exercise, and weight loss.  I have asked patient to check with his insurance company regarding coverage of the Shingrix vaccine.  I strongly recommended.  Patient will follow-up after June 4, 2021 with lab prior and this will be his annual physical.  Otherwise he will follow-up as needed.        Procedures

## 2020-12-17 RX ORDER — METOPROLOL SUCCINATE 25 MG/1
TABLET, EXTENDED RELEASE ORAL
Qty: 90 TABLET | Refills: 3 | Status: SHIPPED | OUTPATIENT
Start: 2020-12-17 | End: 2021-12-14

## 2021-03-31 RX ORDER — EZETIMIBE 10 MG/1
TABLET ORAL
Qty: 90 TABLET | Refills: 3 | Status: SHIPPED | OUTPATIENT
Start: 2021-03-31 | End: 2022-03-08

## 2021-05-12 DIAGNOSIS — I10 BENIGN ESSENTIAL HYPERTENSION: Chronic | ICD-10-CM

## 2021-05-13 RX ORDER — AMLODIPINE BESYLATE 5 MG/1
TABLET ORAL
Qty: 90 TABLET | Refills: 3 | Status: SHIPPED | OUTPATIENT
Start: 2021-05-13 | End: 2022-05-09

## 2021-06-03 ENCOUNTER — LAB (OUTPATIENT)
Dept: LAB | Facility: HOSPITAL | Age: 66
End: 2021-06-03

## 2021-06-03 DIAGNOSIS — R97.20 RISING PSA LEVEL: ICD-10-CM

## 2021-06-03 DIAGNOSIS — E78.2 MIXED HYPERLIPIDEMIA: Chronic | ICD-10-CM

## 2021-06-03 DIAGNOSIS — Z51.81 THERAPEUTIC DRUG MONITORING: ICD-10-CM

## 2021-06-03 DIAGNOSIS — R73.01 IMPAIRED FASTING GLUCOSE: Chronic | ICD-10-CM

## 2021-06-03 DIAGNOSIS — Z00.00 ROUTINE PHYSICAL EXAMINATION: ICD-10-CM

## 2021-06-03 LAB
ALBUMIN SERPL-MCNC: 3.9 G/DL (ref 3.5–5.2)
ALBUMIN/GLOB SERPL: 1.2 G/DL
ALP SERPL-CCNC: 54 U/L (ref 39–117)
ALT SERPL W P-5'-P-CCNC: 21 U/L (ref 1–41)
ANION GAP SERPL CALCULATED.3IONS-SCNC: 10 MMOL/L (ref 5–15)
AST SERPL-CCNC: 23 U/L (ref 1–40)
BILIRUB SERPL-MCNC: 0.3 MG/DL (ref 0–1.2)
BILIRUB UR QL STRIP: NEGATIVE
BUN SERPL-MCNC: 17 MG/DL (ref 8–23)
BUN/CREAT SERPL: 20.5 (ref 7–25)
CALCIUM SPEC-SCNC: 9.4 MG/DL (ref 8.6–10.5)
CHLORIDE SERPL-SCNC: 106 MMOL/L (ref 98–107)
CLARITY UR: CLEAR
CO2 SERPL-SCNC: 26 MMOL/L (ref 22–29)
COLOR UR: YELLOW
CREAT SERPL-MCNC: 0.83 MG/DL (ref 0.76–1.27)
DEPRECATED RDW RBC AUTO: 43.4 FL (ref 37–54)
ERYTHROCYTE [DISTWIDTH] IN BLOOD BY AUTOMATED COUNT: 13.1 % (ref 12.3–15.4)
GFR SERPL CREATININE-BSD FRML MDRD: 93 ML/MIN/1.73
GLOBULIN UR ELPH-MCNC: 3.2 GM/DL
GLUCOSE SERPL-MCNC: 115 MG/DL (ref 65–99)
GLUCOSE UR STRIP-MCNC: NEGATIVE MG/DL
HBA1C MFR BLD: 5.75 % (ref 4.8–5.6)
HCT VFR BLD AUTO: 41.6 % (ref 37.5–51)
HGB BLD-MCNC: 14.2 G/DL (ref 13–17.7)
HGB UR QL STRIP.AUTO: NEGATIVE
KETONES UR QL STRIP: NEGATIVE
LEUKOCYTE ESTERASE UR QL STRIP.AUTO: NEGATIVE
MCH RBC QN AUTO: 31.3 PG (ref 26.6–33)
MCHC RBC AUTO-ENTMCNC: 34.1 G/DL (ref 31.5–35.7)
MCV RBC AUTO: 91.8 FL (ref 79–97)
NITRITE UR QL STRIP: NEGATIVE
PH UR STRIP.AUTO: 7 [PH] (ref 5–8)
PLATELET # BLD AUTO: 227 10*3/MM3 (ref 140–450)
PMV BLD AUTO: 9.6 FL (ref 6–12)
POTASSIUM SERPL-SCNC: 4.6 MMOL/L (ref 3.5–5.2)
PROT SERPL-MCNC: 7.1 G/DL (ref 6–8.5)
PROT UR QL STRIP: NEGATIVE
PSA SERPL-MCNC: 3.36 NG/ML (ref 0–4)
RBC # BLD AUTO: 4.53 10*6/MM3 (ref 4.14–5.8)
SODIUM SERPL-SCNC: 142 MMOL/L (ref 136–145)
SP GR UR STRIP: 1.02 (ref 1–1.03)
T3FREE SERPL-MCNC: 2.72 PG/ML (ref 2–4.4)
T4 FREE SERPL-MCNC: 1.03 NG/DL (ref 0.93–1.7)
TSH SERPL DL<=0.05 MIU/L-ACNC: 2.27 UIU/ML (ref 0.27–4.2)
UROBILINOGEN UR QL STRIP: NORMAL
WBC # BLD AUTO: 5.34 10*3/MM3 (ref 3.4–10.8)

## 2021-06-03 PROCEDURE — 84439 ASSAY OF FREE THYROXINE: CPT

## 2021-06-03 PROCEDURE — 83704 LIPOPROTEIN BLD QUAN PART: CPT

## 2021-06-03 PROCEDURE — 85027 COMPLETE CBC AUTOMATED: CPT

## 2021-06-03 PROCEDURE — 81003 URINALYSIS AUTO W/O SCOPE: CPT

## 2021-06-03 PROCEDURE — 83036 HEMOGLOBIN GLYCOSYLATED A1C: CPT

## 2021-06-03 PROCEDURE — 84443 ASSAY THYROID STIM HORMONE: CPT

## 2021-06-03 PROCEDURE — 36415 COLL VENOUS BLD VENIPUNCTURE: CPT

## 2021-06-03 PROCEDURE — 80061 LIPID PANEL: CPT

## 2021-06-03 PROCEDURE — 80053 COMPREHEN METABOLIC PANEL: CPT

## 2021-06-03 PROCEDURE — 84481 FREE ASSAY (FT-3): CPT

## 2021-06-03 PROCEDURE — 84153 ASSAY OF PSA TOTAL: CPT

## 2021-06-05 LAB
CHOLEST SERPL-MCNC: 172 MG/DL (ref 100–199)
HDL SERPL-SCNC: 43.9 UMOL/L
HDLC SERPL-MCNC: 68 MG/DL
LDL SERPL QN: 21 NM
LDL SERPL-SCNC: 1283 NMOL/L
LDL SMALL SERPL-SCNC: 689 NMOL/L
LDLC SERPL CALC-MCNC: 89 MG/DL (ref 0–99)
TRIGL SERPL-MCNC: 80 MG/DL (ref 0–149)

## 2021-06-10 ENCOUNTER — OFFICE VISIT (OUTPATIENT)
Dept: INTERNAL MEDICINE | Facility: CLINIC | Age: 66
End: 2021-06-10

## 2021-06-10 VITALS
DIASTOLIC BLOOD PRESSURE: 86 MMHG | WEIGHT: 198 LBS | SYSTOLIC BLOOD PRESSURE: 140 MMHG | HEIGHT: 70 IN | BODY MASS INDEX: 28.35 KG/M2 | TEMPERATURE: 98 F | RESPIRATION RATE: 16 BRPM | OXYGEN SATURATION: 97 % | HEART RATE: 79 BPM

## 2021-06-10 DIAGNOSIS — Z78.9 STATIN INTOLERANCE: Chronic | ICD-10-CM

## 2021-06-10 DIAGNOSIS — M79.10 MYALGIA: Chronic | ICD-10-CM

## 2021-06-10 DIAGNOSIS — E66.9 NON MORBID OBESITY: Chronic | ICD-10-CM

## 2021-06-10 DIAGNOSIS — N40.0 BENIGN PROSTATIC HYPERPLASIA WITHOUT LOWER URINARY TRACT SYMPTOMS: ICD-10-CM

## 2021-06-10 DIAGNOSIS — E55.9 VITAMIN D DEFICIENCY: Chronic | ICD-10-CM

## 2021-06-10 DIAGNOSIS — Z00.00 ROUTINE PHYSICAL EXAMINATION: Primary | ICD-10-CM

## 2021-06-10 DIAGNOSIS — I10 BENIGN ESSENTIAL HYPERTENSION: Chronic | ICD-10-CM

## 2021-06-10 DIAGNOSIS — R73.01 IMPAIRED FASTING GLUCOSE: Chronic | ICD-10-CM

## 2021-06-10 DIAGNOSIS — Z51.81 THERAPEUTIC DRUG MONITORING: ICD-10-CM

## 2021-06-10 DIAGNOSIS — E78.2 MIXED HYPERLIPIDEMIA: Chronic | ICD-10-CM

## 2021-06-10 DIAGNOSIS — I10 WHITE COAT SYNDROME WITH DIAGNOSIS OF HYPERTENSION: Chronic | ICD-10-CM

## 2021-06-10 DIAGNOSIS — R97.20 RISING PSA LEVEL: ICD-10-CM

## 2021-06-10 DIAGNOSIS — Z80.0 FAMILY HISTORY OF COLON CANCER: Chronic | ICD-10-CM

## 2021-06-10 DIAGNOSIS — Z12.11 COLON CANCER SCREENING: ICD-10-CM

## 2021-06-10 PROCEDURE — 99396 PREV VISIT EST AGE 40-64: CPT | Performed by: INTERNAL MEDICINE

## 2021-06-10 NOTE — PROGRESS NOTES
06/10/2021    Patient Information  George Chavez                                                                                          [id number] [facility] [id number]      1955  [unfilled]  There is no work phone number on file.    Chief Complaint:     Routine annual physical examination and follow-up blood work in order to monitor chronic medical issues listed in history of present illness.  No new acute complaints.    History of Present Illness:    Patient with a history of impaired fasting glucose, hyperlipidemia, statin intolerance due to myalgias from Lipitor, hypertension, whitecoat syndrome, family history of colon cancer, vitamin D deficiency, nonmorbid obesity, rising PSA level.  He presents today for his routine annual physical exam and follow-up blood work.  His past medical history reviewed and updated were necessary including health maintenance parameters.  This reveals he will be up-to-date or else accounted for after today's visit.    Review of Systems   Constitutional: Negative.   HENT: Negative.    Eyes: Negative.    Cardiovascular: Negative.    Respiratory: Negative.    Endocrine: Negative.    Hematologic/Lymphatic: Negative.    Skin: Negative.    Musculoskeletal: Negative.    Gastrointestinal: Negative.    Genitourinary: Negative.    Neurological: Negative.    Psychiatric/Behavioral: Negative.    Allergic/Immunologic: Negative.        Active Problems:    Patient Active Problem List   Diagnosis   • Benign essential hypertension   • Lumbar scoliosis   • Hyperlipidemia   • Impaired fasting glucose   • Vitamin D deficiency   • Therapeutic drug monitoring   • Routine physical examination   • Family history of colon cancer   • Myalgia, secondary Lipitor   • Multiple actinic keratoses   • Statin intolerance   • Rising PSA level   • Non morbid obesity   • White coat syndrome with diagnosis of hypertension   • Benign prostatic hyperplasia without lower urinary tract  symptoms         Past Medical History:   Diagnosis Date   • Benign essential hypertension 2/15/2016    01/28/2004--treatment for hypertension begun.   • Family history of colon cancer 2/16/2016    Maternal grandfather had colon cancer at age 86.   • History of Palpitations 07/12/2010 07/12/2010--24-hour Holter monitor revealed several PVCs and fewer PACs. No significant arrhythmia. Stress echocardiogram 06/06/2010 normal.   • Hyperlipidemia 2/15/2016    12/27/2010--treatment for hyperlipidemia begun.   • Impaired fasting glucose 2/15/2016    11/01/2007--initial diagnosis.   • Lumbar scoliosis 2/15/2016    09/23/2011--MRI lumbar spine revealed mild to moderate degenerative arthritis, dextro scoliosis with the apex centered around L3 and L4.   • Multiple actinic keratoses 3/23/2017    03/23/2017--1 actinic keratosis on the left forearm, 2 actinic keratoses on the right forearm, were destroyed with liquid nitrogen today.   • Myalgia, secondary Lipitor 3/23/2017    05/04/2017--patient seen in follow-up and reports return of myalgias after changing to 10 mg of Crestor daily.  He questions whether or not 5 mg per day might do the job without myalgias.  After reviewing his NMR profile which was excellent, I think this would be a good idea.  He continues to have myalgias related to statin therapy, another consideration would be Zetia.  03/23/2017--patient seen in follow-up and reports his myalgias have essentially resolved after discontinuation of Lipitor.  We will try generic Crestor 10 mg per day.  Reassess with lab in about 6 weeks.  03/02/2017--patient reports muscular aches and pains, particularly lower extremities possibly related to Lipitor, despite taking coenzyme Q 10 300 mg per day.  Lipitor discontinued.   • Non morbid obesity 7/30/2019   • Statin intolerance 1/30/2018   • Vitamin D deficiency 2/15/2016   • White coat syndrome with diagnosis of hypertension 2/13/2020         Past Surgical History:   Procedure  Laterality Date   • COLONOSCOPY  04/11/2011 04/11/2011--normal colonoscopy.    • COLONOSCOPY  12/10/2005    12/10/2005--normal colonoscopy.   • COLONOSCOPY N/A 7/13/2016 07/13/2016--normal colonoscopy to the cecum with a good prep.   • JOINT MANIPULATION Left 8/16/2018    Procedure: LT SHOLDER MANIPULATION;  Surgeon: Mo Lemus MD;  [place] [facility] [place] [facility] [place]: [place]opedics   • LAPAROSCOPIC CHOLECYSTECTOMY  08/22/2011 08/22/2011--laparoscopic cholecystectomy         No Known Allergies        Current Outpatient Medications:   •  amLODIPine (NORVASC) 5 MG tablet, TAKE 1 TABLET DAILY FOR HIGH BLOOD PRESSURE AS DIRECTED, Disp: 90 tablet, Rfl: 3  •  aspirin 81 MG EC tablet, Take 81 mg by mouth Daily., Disp: , Rfl:   •  Cholecalciferol (VITAMIN D) 2000 units capsule, Take 2,000 Units by mouth Daily., Disp: , Rfl:   •  ezetimibe (ZETIA) 10 MG tablet, TAKE 1 TABLET EVERY EVENING, Disp: 90 tablet, Rfl: 3  •  metoprolol succinate XL (TOPROL-XL) 25 MG 24 hr tablet, TAKE 1 TABLET DAILY, Disp: 90 tablet, Rfl: 3  •  Multiple Vitamin (MULTIVITAMINS PO), Take by mouth daily., Disp: , Rfl:   •  olmesartan-hydrochlorothiazide (BENICAR HCT) 40-12.5 MG per tablet, TAKE 1 TABLET DAILY, Disp: 90 tablet, Rfl: 3      Family History   Problem Relation Age of Onset   • Heart attack Mother         Mother had a Myocardial Infarction at age 81. Status post PTCA with stents.   • Heart attack Father         Father had a myocardial infarction at age 60.   • Cancer Maternal Grandfather         MGF was diagnosed with colon cancer at age 86   • Malig Hyperthermia Neg Hx          Social History     Socioeconomic History   • Marital status:      Spouse name: Not on file   • Number of children: Not on file   • Years of education: Not on file   • Highest education level: Not on file   Tobacco Use   • Smoking status: Former Smoker     Packs/day: 0.50     Years: 18.00     Pack years: 9.00     Types: Cigarettes   • Smokeless  "tobacco: Never Used   • Tobacco comment: Stopped smoking at age 41.   Vaping Use   • Vaping Use: Never used   Substance and Sexual Activity   • Alcohol use: Yes     Comment: SOCIALLY   • Drug use: No   • Sexual activity: Yes     Partners: Female         Vitals:    06/10/21 0900   BP: 140/86   Pulse: 79   Resp: 16   Temp: 98 °F (36.7 °C)   SpO2: 97%   Weight: 89.8 kg (198 lb)   Height: 177.8 cm (70\")        Body mass index is 28.41 kg/m².      Physical Exam:    General: Alert and oriented x 3.  No acute distress.  Overweight.  Normal affect.  HEENT: Pupils equal, round, reactive to light; extraocular movements intact; sclerae nonicteric; pharynx, ear canals and TMs normal.  Neck: Without JVD, thyromegaly, bruit, or adenopathy.  Lungs: Clear to auscultation in all fields.  Heart: Regular rate and rhythm without murmur, rub, gallop, or click.  Abdomen: Soft, nontender, without hepatosplenomegaly or hernia.  Bowel sounds normal.  : Deferred.  Rectal: Deferred.  Extremities: Without clubbing, cyanosis, edema, or pulse deficit.  Neurologic: Intact without focal deficit.  Normal station and gait observed during ingress and egress from the examination room.  Skin: Without significant lesion.  Musculoskeletal: Unremarkable.    Lab/other results:    NMR reveals a total cholesterol of 172.  Triglycerides 80.  LDL particle number mildly elevated 1283.  Small LDL particle number mildly elevated at 689.  HDL particle number very good at 43.9.  CMP normal except glucose 115.  Hemoglobin A1c 5.75.  Urinalysis normal.  Thyroid function test normal.  PSA normal at 3.36.  CBC normal.    Assessment/Plan:     Diagnosis Plan   1. Routine physical examination     2. Impaired fasting glucose  Comprehensive Metabolic Panel    Hemoglobin A1c   3. Hyperlipidemia  CK    Comprehensive Metabolic Panel    NMR LipoProfile   4. Statin intolerance     5. Myalgia, secondary Lipitor     6. Benign essential hypertension     7. White coat syndrome " with diagnosis of hypertension     8. Family history of colon cancer  Ambulatory Referral For Screening Colonoscopy   9. Vitamin D deficiency     10. Non morbid obesity     11. Rising PSA level  PSA DIAGNOSTIC   12. Therapeutic drug monitoring     13. Benign prostatic hyperplasia without lower urinary tract symptoms     14. Colon cancer screening  Ambulatory Referral For Screening Colonoscopy     Patient presents with essentially normal annual physical except for the following issues: He has mild impaired fasting glucose that does not require medication.  Strongly recommend low carbohydrate diet, exercise, and weight loss.  Hyperlipidemia is under good control with Zetia although is not perfect.  Diet will also help.  Patient does have documented statin intolerance.  In regards to his blood pressure is a little elevated today which is always is when he comes into the office due to his whitecoat syndrome.  I think that overall his blood pressure is adequately controlled under usual circumstances.  Patient has a family history of colon cancer and is up-to-date on his colonoscopy.  Vitamin D is in the normal range.  We will monitor his slowly rising PSA which actually is better now than it was 6 months ago.    Several preventative health issues discussed including review of vaccinations and recommendations, including dietary issues, exercise and weight loss.  Safe sex practices discussed.  Patient advised to wear seatbelt whenever driving and avoid texting and driving.  Also advised to look both ways before crossing the street.  Patient is up-to-date on colon cancer screening.  Advised to avoid tobacco products and minimize alcohol consumption.    Plan is as follows: Patient will work on diet, exercise, and weight loss.  No changes in current medical regimen.  I will have him follow-up in 6 months with lab prior or follow-up as needed.  Patient will be due for his colonoscopy July 13, 2021 and I will go ahead and  schedule that.    Procedures

## 2021-12-02 ENCOUNTER — LAB (OUTPATIENT)
Dept: LAB | Facility: HOSPITAL | Age: 66
End: 2021-12-02

## 2021-12-02 DIAGNOSIS — R73.01 IMPAIRED FASTING GLUCOSE: Chronic | ICD-10-CM

## 2021-12-02 DIAGNOSIS — R97.20 RISING PSA LEVEL: ICD-10-CM

## 2021-12-02 DIAGNOSIS — E78.2 MIXED HYPERLIPIDEMIA: Chronic | ICD-10-CM

## 2021-12-02 LAB
ALBUMIN SERPL-MCNC: 3.8 G/DL (ref 3.5–5.2)
ALBUMIN/GLOB SERPL: 1.4 G/DL
ALP SERPL-CCNC: 60 U/L (ref 39–117)
ALT SERPL W P-5'-P-CCNC: 16 U/L (ref 1–41)
ANION GAP SERPL CALCULATED.3IONS-SCNC: 5.9 MMOL/L (ref 5–15)
AST SERPL-CCNC: 20 U/L (ref 1–40)
BILIRUB SERPL-MCNC: 0.2 MG/DL (ref 0–1.2)
BUN SERPL-MCNC: 17 MG/DL (ref 8–23)
BUN/CREAT SERPL: 28.8 (ref 7–25)
CALCIUM SPEC-SCNC: 9.1 MG/DL (ref 8.6–10.5)
CHLORIDE SERPL-SCNC: 104 MMOL/L (ref 98–107)
CK SERPL-CCNC: 105 U/L (ref 20–200)
CO2 SERPL-SCNC: 27.1 MMOL/L (ref 22–29)
CREAT SERPL-MCNC: 0.59 MG/DL (ref 0.76–1.27)
GFR SERPL CREATININE-BSD FRML MDRD: 138 ML/MIN/1.73
GLOBULIN UR ELPH-MCNC: 2.8 GM/DL
GLUCOSE SERPL-MCNC: 102 MG/DL (ref 65–99)
HBA1C MFR BLD: 5.73 % (ref 4.8–5.6)
POTASSIUM SERPL-SCNC: 3.7 MMOL/L (ref 3.5–5.2)
PROT SERPL-MCNC: 6.6 G/DL (ref 6–8.5)
PSA SERPL-MCNC: 2.82 NG/ML (ref 0–4)
SODIUM SERPL-SCNC: 137 MMOL/L (ref 136–145)

## 2021-12-02 PROCEDURE — 83704 LIPOPROTEIN BLD QUAN PART: CPT

## 2021-12-02 PROCEDURE — 84153 ASSAY OF PSA TOTAL: CPT

## 2021-12-02 PROCEDURE — 80061 LIPID PANEL: CPT

## 2021-12-02 PROCEDURE — 82550 ASSAY OF CK (CPK): CPT

## 2021-12-02 PROCEDURE — 80053 COMPREHEN METABOLIC PANEL: CPT

## 2021-12-02 PROCEDURE — 36415 COLL VENOUS BLD VENIPUNCTURE: CPT

## 2021-12-02 PROCEDURE — 83036 HEMOGLOBIN GLYCOSYLATED A1C: CPT

## 2021-12-04 LAB
CHOLEST SERPL-MCNC: 172 MG/DL (ref 100–199)
HDL SERPL-SCNC: 43.6 UMOL/L
HDLC SERPL-MCNC: 68 MG/DL
LDL SERPL QN: 21.4 NM
LDL SERPL-SCNC: 1123 NMOL/L
LDL SMALL SERPL-SCNC: 484 NMOL/L
LDLC SERPL CALC-MCNC: 90 MG/DL (ref 0–99)
TRIGL SERPL-MCNC: 74 MG/DL (ref 0–149)

## 2021-12-09 ENCOUNTER — OFFICE VISIT (OUTPATIENT)
Dept: INTERNAL MEDICINE | Facility: CLINIC | Age: 66
End: 2021-12-09

## 2021-12-09 VITALS
HEIGHT: 70 IN | SYSTOLIC BLOOD PRESSURE: 128 MMHG | WEIGHT: 188 LBS | HEART RATE: 71 BPM | DIASTOLIC BLOOD PRESSURE: 70 MMHG | RESPIRATION RATE: 17 BRPM | BODY MASS INDEX: 26.92 KG/M2 | OXYGEN SATURATION: 99 %

## 2021-12-09 DIAGNOSIS — M79.10 MYALGIA: Chronic | ICD-10-CM

## 2021-12-09 DIAGNOSIS — E78.2 MIXED HYPERLIPIDEMIA: Chronic | ICD-10-CM

## 2021-12-09 DIAGNOSIS — Z12.11 COLON CANCER SCREENING: ICD-10-CM

## 2021-12-09 DIAGNOSIS — I10 WHITE COAT SYNDROME WITH DIAGNOSIS OF HYPERTENSION: Chronic | ICD-10-CM

## 2021-12-09 DIAGNOSIS — E55.9 VITAMIN D DEFICIENCY: Chronic | ICD-10-CM

## 2021-12-09 DIAGNOSIS — R73.01 IMPAIRED FASTING GLUCOSE: Chronic | ICD-10-CM

## 2021-12-09 DIAGNOSIS — N40.0 BENIGN PROSTATIC HYPERPLASIA WITHOUT LOWER URINARY TRACT SYMPTOMS: Chronic | ICD-10-CM

## 2021-12-09 DIAGNOSIS — Z00.00 WELCOME TO MEDICARE PREVENTIVE VISIT: Primary | ICD-10-CM

## 2021-12-09 DIAGNOSIS — I10 BENIGN ESSENTIAL HYPERTENSION: Chronic | ICD-10-CM

## 2021-12-09 DIAGNOSIS — Z80.0 FAMILY HISTORY OF COLON CANCER: Chronic | ICD-10-CM

## 2021-12-09 DIAGNOSIS — Z51.81 THERAPEUTIC DRUG MONITORING: ICD-10-CM

## 2021-12-09 DIAGNOSIS — Z78.9 STATIN INTOLERANCE: Chronic | ICD-10-CM

## 2021-12-09 DIAGNOSIS — R97.20 RISING PSA LEVEL: ICD-10-CM

## 2021-12-09 DIAGNOSIS — E66.9 NON MORBID OBESITY: Chronic | ICD-10-CM

## 2021-12-09 PROCEDURE — 99214 OFFICE O/P EST MOD 30 MIN: CPT | Performed by: INTERNAL MEDICINE

## 2021-12-09 PROCEDURE — 1170F FXNL STATUS ASSESSED: CPT | Performed by: INTERNAL MEDICINE

## 2021-12-09 PROCEDURE — G0402 INITIAL PREVENTIVE EXAM: HCPCS | Performed by: INTERNAL MEDICINE

## 2021-12-09 PROCEDURE — 1159F MED LIST DOCD IN RCRD: CPT | Performed by: INTERNAL MEDICINE

## 2021-12-09 NOTE — PROGRESS NOTES
The ABCs of the Annual Wellness Visit  Subsequent Medicare Wellness Visit    No chief complaint on file.     Subjective    History of Present Illness:  George Chavez is a 66 y.o. male who presents for a Subsequent Medicare Wellness Visit.    The following portions of the patient's history were reviewed and   updated as appropriate: allergies, current medications, past family history, past medical history, past social history, past surgical history and problem list.    Compared to one year ago, the patient feels his physical   health is better.    Compared to one year ago, the patient feels his mental   health is better.    Recent Hospitalizations:  He was not admitted to the hospital during the last year.       Current Medical Providers:  Patient Care Team:  Taj Forbes MD as PCP - General (Internal Medicine)    Outpatient Medications Prior to Visit   Medication Sig Dispense Refill   • amLODIPine (NORVASC) 5 MG tablet TAKE 1 TABLET DAILY FOR HIGH BLOOD PRESSURE AS DIRECTED 90 tablet 3   • aspirin 81 MG EC tablet Take 81 mg by mouth Daily.     • Cholecalciferol (VITAMIN D) 2000 units capsule Take 2,000 Units by mouth Daily.     • ezetimibe (ZETIA) 10 MG tablet TAKE 1 TABLET EVERY EVENING 90 tablet 3   • metoprolol succinate XL (TOPROL-XL) 25 MG 24 hr tablet TAKE 1 TABLET DAILY 90 tablet 3   • Multiple Vitamin (MULTIVITAMINS PO) Take by mouth daily.     • olmesartan-hydrochlorothiazide (BENICAR HCT) 40-12.5 MG per tablet TAKE 1 TABLET DAILY 90 tablet 3     No facility-administered medications prior to visit.       No opioid medication identified on active medication list. I have reviewed chart for other potential  high risk medication/s and harmful drug interactions in the elderly.          Aspirin is on active medication list. Aspirin use is indicated based on review of current medical condition/s. Pros and cons of this therapy have been discussed today. Benefits of this medication outweigh potential harm.   "Patient has been encouraged to continue taking this medication.  .      Patient Active Problem List   Diagnosis   • Benign essential hypertension   • Lumbar scoliosis   • Hyperlipidemia   • Impaired fasting glucose   • Vitamin D deficiency   • Therapeutic drug monitoring   • Family history of colon cancer   • Myalgia, secondary Lipitor   • Multiple actinic keratoses   • Statin intolerance   • Rising PSA level   • Non morbid obesity   • White coat syndrome with diagnosis of hypertension   • Benign prostatic hyperplasia without lower urinary tract symptoms     Advance Care Planning  Advance Directive is not on file.  ACP discussion was held with the patient during this visit. Patient does not have an advance directive, information provided.    Review of Systems   Constitutional: Negative.    HENT: Negative.    Eyes: Negative.    Respiratory: Negative.    Cardiovascular: Negative.    Gastrointestinal: Negative.    Endocrine: Negative.    Genitourinary: Negative.    Musculoskeletal: Negative.    Skin: Negative.    Allergic/Immunologic: Negative.    Neurological: Negative.    Hematological: Negative.    Psychiatric/Behavioral: Negative.         Objective    Vitals:    12/09/21 0854   BP: 128/70   Pulse: 71   Resp: 17   SpO2: 99%   Weight: 85.3 kg (188 lb)   Height: 177.8 cm (70\")     BMI Readings from Last 1 Encounters:   12/09/21 26.98 kg/m²   BMI is above normal parameters. Recommendations include: exercise counseling and nutrition counseling    Does the patient have evidence of cognitive impairment? No    Physical Exam     General: Alert and oriented x 3.  No acute distress.  Normal affect.  Mildly overweight.  HEENT: Pupils equal, round, reactive to light; extraocular movements intact; sclerae nonicteric; pharynx, ear canals and TMs normal.  Neck: Without JVD, thyromegaly, bruit, or adenopathy.  Lungs: Clear to auscultation in all fields.  Heart: Regular rate and rhythm without murmur, rub, gallop, or click.  " Abdomen: Soft, nontender, without hepatosplenomegaly or hernia.  Bowel sounds normal.  : Deferred.  Rectal: Deferred.  Extremities: Without clubbing, cyanosis, edema, or pulse deficit.  Neurologic: Intact without focal deficit.  Normal station and gait observed during ingress and egress from the examination room.  Skin: Without significant lesion.  Musculoskeletal: Unremarkable.    Lab Results   Component Value Date    CHLPL 172 12/02/2021    TRIG 74 12/02/2021    HGBA1C 5.73 (H) 12/02/2021            HEALTH RISK ASSESSMENT    Smoking Status:  Social History     Tobacco Use   Smoking Status Former Smoker   • Packs/day: 0.50   • Years: 18.00   • Pack years: 9.00   • Types: Cigarettes   Smokeless Tobacco Never Used   Tobacco Comment    Stopped smoking at age 41.     Alcohol Consumption:  Social History     Substance and Sexual Activity   Alcohol Use Yes    Comment: SOCIALLY     Fall Risk Screen:    GERSONADI Fall Risk Assessment was completed, and patient is at LOW risk for falls.Assessment completed on:12/9/2021    Depression Screening:  PHQ-2/PHQ-9 Depression Screening 6/10/2021   Little interest or pleasure in doing things 0   Feeling down, depressed, or hopeless 0   Trouble falling or staying asleep, or sleeping too much -   Feeling tired or having little energy -   Poor appetite or overeating -   Feeling bad about yourself - or that you are a failure or have let yourself or your family down -   Trouble concentrating on things, such as reading the newspaper or watching television -   Moving or speaking so slowly that other people could have noticed. Or the opposite - being so fidgety or restless that you have been moving around a lot more than usual -   Thoughts that you would be better off dead, or of hurting yourself in some way -   Total Score 0   If you checked off any problems, how difficult have these problems made it for you to do your work, take care of things at home, or get along with other people? -        Health Habits and Functional and Cognitive Screening:  Functional & Cognitive Status 12/9/2021   Do you have difficulty preparing food and eating? No   Do you have difficulty bathing yourself, getting dressed or grooming yourself? No   Do you have difficulty using the toilet? No   Do you have difficulty moving around from place to place? No   Do you have trouble with steps or getting out of a bed or a chair? No   Current Diet Well Balanced Diet   Dental Exam Up to date   Eye Exam Not up to date   Exercise (times per week) 5 times per week   Current Exercises Include Walking   Do you need help using the phone?  No   Are you deaf or do you have serious difficulty hearing?  No   Do you need help with transportation? No   Do you need help shopping? No   Do you need help preparing meals?  No   Do you need help with housework?  No   Do you need help with laundry? No   Do you need help taking your medications? No   Do you need help managing money? No   Do you ever drive or ride in a car without wearing a seat belt? No   Have you felt unusual stress, anger or loneliness in the last month? No   Who do you live with? Spouse   If you need help, do you have trouble finding someone available to you? No   Have you been bothered in the last four weeks by sexual problems? No   Do you have difficulty concentrating, remembering or making decisions? No       Age-appropriate Screening Schedule:  Refer to the list below for future screening recommendations based on patient's age, sex and/or medical conditions. Orders for these recommended tests are listed in the plan section. The patient has been provided with a written plan.    Health Maintenance   Topic Date Due   • ZOSTER VACCINE (2 of 3) 05/18/2017   • INFLUENZA VACCINE  12/16/2021 (Originally 8/1/2021)   • LIPID PANEL  12/02/2022   • TDAP/TD VACCINES (2 - Td or Tdap) 03/02/2027              Assessment/Plan   CMS Preventative Services Quick Reference  Risk Factors Identified  During Encounter  Cardiovascular Disease  Immunizations Discussed/Encouraged (specific Immunizations; Influenza and Shingrix  Obesity/Overweight   The above risks/problems have been discussed with the patient.  Follow up actions/plans if indicated are seen below in the Assessment/Plan Section.  Pertinent information has been shared with the patient in the After Visit Summary.    Diagnoses and all orders for this visit:    1. Welcome to Medicare preventive visit (Primary)    2. Impaired fasting glucose  -     Comprehensive Metabolic Panel; Future  -     Hemoglobin A1c; Future  -     Urinalysis With Microscopic If Indicated (No Culture) - Urine, Clean Catch; Future    3. Benign essential hypertension    4. White coat syndrome with diagnosis of hypertension    5. Hyperlipidemia  -     Comprehensive Metabolic Panel; Future  -     NMR LipoProfile; Future  -     TSH; Future  -     T4, Free; Future  -     T3, Free; Future    6. Statin intolerance    7. Myalgia, secondary Lipitor    8. Vitamin D deficiency  -     Vitamin D 25 Hydroxy; Future    9. Family history of colon cancer  -     Ambulatory Referral For Screening Colonoscopy    10. Benign prostatic hyperplasia without lower urinary tract symptoms  -     PSA DIAGNOSTIC; Future    11. Rising PSA level  -     PSA DIAGNOSTIC; Future    12. Non morbid obesity    13. Colon cancer screening  -     Ambulatory Referral For Screening Colonoscopy    14. Therapeutic drug monitoring  -     CBC (No Diff); Future  -     Urinalysis With Microscopic If Indicated (No Culture) - Urine, Clean Catch; Future        Follow Up:   Return in about 6 months (around 6/9/2022) for Next scheduled follow up with lab prior.     An After Visit Summary and PPPS were made available to the patient.

## 2021-12-09 NOTE — PROGRESS NOTES
12/09/2021    Patient Information  George Chavez                                                                                          [address] [address] [address]      1955  [unfilled]  There is no work phone number on file.    Chief Complaint:     Welcome to Medicare visit.  Follow-up lab work in order to monitor chronic medical issues listed in history of present illness.  No new acute complaints.    History of Present Illness:    Patient with a history of impaired fasting glucose, hypertension which tends to fluctuate and evidence for whitecoat syndrome, hyperlipidemia, statin intolerance, myalgias secondary to Lipitor, vitamin D deficiency, family history of colon cancer, asymptomatic BPH and rising PSA history.  He presents today for his welcome to Medicare visit.  He also had lab work in order to monitor his chronic medical issues.  His past medical history reviewed and updated were necessary including health maintenance parameters.  This reveals he needs influenza vaccine but we are currently out of stock.  Patient also should have Shingrix vaccine but I explained to him he needs to have that done at a local pharmacy or other facility where it is cheaper for him to get.  Medicare does not really covered here in the office.  Patient also needs colon cancer screening given family history of colon cancer.  I will place an order back in June but this has not been done.  I will replace the order.    Review of Systems   Constitutional: Negative.   HENT: Negative.    Eyes: Negative.    Cardiovascular: Negative.    Respiratory: Negative.    Endocrine: Negative.    Hematologic/Lymphatic: Negative.    Skin: Negative.    Musculoskeletal: Negative.    Gastrointestinal: Negative.    Genitourinary: Negative.    Neurological: Negative.    Psychiatric/Behavioral: Negative.    Allergic/Immunologic: Negative.        Active Problems:    Patient Active Problem List   Diagnosis   • Benign  essential hypertension   • Lumbar scoliosis   • Hyperlipidemia   • Impaired fasting glucose   • Vitamin D deficiency   • Therapeutic drug monitoring   • Family history of colon cancer   • Myalgia, secondary Lipitor   • Multiple actinic keratoses   • Statin intolerance   • Rising PSA level   • Non morbid obesity   • White coat syndrome with diagnosis of hypertension   • Benign prostatic hyperplasia without lower urinary tract symptoms         Past Medical History:   Diagnosis Date   • Benign essential hypertension 2/15/2016    01/28/2004--treatment for hypertension begun.   • Benign prostatic hyperplasia without lower urinary tract symptoms 6/10/2021   • Family history of colon cancer 2/16/2016    Maternal grandfather had colon cancer at age 86.   • History of Palpitations 07/12/2010 07/12/2010--24-hour Holter monitor revealed several PVCs and fewer PACs. No significant arrhythmia. Stress echocardiogram 06/06/2010 normal.   • Hyperlipidemia 2/15/2016    12/27/2010--treatment for hyperlipidemia begun.   • Impaired fasting glucose 2/15/2016    11/01/2007--initial diagnosis.   • Lumbar scoliosis 2/15/2016    09/23/2011--MRI lumbar spine revealed mild to moderate degenerative arthritis, dextro scoliosis with the apex centered around L3 and L4.   • Multiple actinic keratoses 3/23/2017    03/23/2017--1 actinic keratosis on the left forearm, 2 actinic keratoses on the right forearm, were destroyed with liquid nitrogen today.   • Myalgia, secondary Lipitor 3/23/2017    05/04/2017--patient seen in follow-up and reports return of myalgias after changing to 10 mg of Crestor daily.  He questions whether or not 5 mg per day might do the job without myalgias.  After reviewing his NMR profile which was excellent, I think this would be a good idea.  He continues to have myalgias related to statin therapy, another consideration would be Zetia.  03/23/2017--patient seen in follow-up and reports his myalgias have essentially  resolved after discontinuation of Lipitor.  We will try generic Crestor 10 mg per day.  Reassess with lab in about 6 weeks.  03/02/2017--patient reports muscular aches and pains, particularly lower extremities possibly related to Lipitor, despite taking coenzyme Q 10 300 mg per day.  Lipitor discontinued.   • Non morbid obesity 7/30/2019   • Statin intolerance 1/30/2018   • Vitamin D deficiency 2/15/2016   • White coat syndrome with diagnosis of hypertension 2/13/2020         Past Surgical History:   Procedure Laterality Date   • COLONOSCOPY  04/11/2011 04/11/2011--normal colonoscopy.    • COLONOSCOPY  12/10/2005    12/10/2005--normal colonoscopy.   • COLONOSCOPY N/A 7/13/2016 07/13/2016--normal colonoscopy to the cecum with a good prep.   • JOINT MANIPULATION Left 8/16/2018    Procedure: LT SHOLDER MANIPULATION;  Surgeon: Mo Lemus MD;  Location: [facility] [place] [facility] [place]  Service: Orthopedics   • LAPAROSCOPIC CHOLECYSTECTOMY  08/22/2011 08/22/2011--laparoscopic cholecystectomy         No Known Allergies        Current Outpatient Medications:   •  amLODIPine (NORVASC) 5 MG tablet, TAKE 1 TABLET DAILY FOR HIGH BLOOD PRESSURE AS DIRECTED, Disp: 90 tablet, Rfl: 3  •  aspirin 81 MG EC tablet, Take 81 mg by mouth Daily., Disp: , Rfl:   •  Cholecalciferol (VITAMIN D) 2000 units capsule, Take 2,000 Units by mouth Daily., Disp: , Rfl:   •  ezetimibe (ZETIA) 10 MG tablet, TAKE 1 TABLET EVERY EVENING, Disp: 90 tablet, Rfl: 3  •  metoprolol succinate XL (TOPROL-XL) 25 MG 24 hr tablet, TAKE 1 TABLET DAILY, Disp: 90 tablet, Rfl: 3  •  Multiple Vitamin (MULTIVITAMINS PO), Take by mouth daily., Disp: , Rfl:   •  olmesartan-hydrochlorothiazide (BENICAR HCT) 40-12.5 MG per tablet, TAKE 1 TABLET DAILY, Disp: 90 tablet, Rfl: 3      Family History   Problem Relation Age of Onset   • Heart attack Mother         Mother had a Myocardial Infarction at age 81. Status post PTCA with stents.   • Heart attack Father         Father had a  "myocardial infarction at age 60.   • Cancer Maternal Grandfather         RABIA was diagnosed with colon cancer at age 86   • Malig Hyperthermia Neg Hx          Social History     Socioeconomic History   • Marital status:    Tobacco Use   • Smoking status: Former Smoker     Packs/day: 0.50     Years: 18.00     Pack years: 9.00     Types: Cigarettes   • Smokeless tobacco: Never Used   • Tobacco comment: Stopped smoking at age 41.   Vaping Use   • Vaping Use: Never used   Substance and Sexual Activity   • Alcohol use: Yes     Comment: SOCIALLY   • Drug use: No   • Sexual activity: Yes     Partners: Female         Vitals:    12/09/21 0854   BP: 128/70   Pulse: 71   Resp: 17   SpO2: 99%   Weight: 85.3 kg (188 lb)   Height: 177.8 cm (70\")        Body mass index is 26.98 kg/m².      Physical Exam:    General: Alert and oriented x 3.  No acute distress.  Normal affect.  Mildly overweight.  HEENT: Pupils equal, round, reactive to light; extraocular movements intact; sclerae nonicteric; pharynx, ear canals and TMs normal.  Neck: Without JVD, thyromegaly, bruit, or adenopathy.  Lungs: Clear to auscultation in all fields.  Heart: Regular rate and rhythm without murmur, rub, gallop, or click.  Abdomen: Soft, nontender, without hepatosplenomegaly or hernia.  Bowel sounds normal.  : Deferred.  Rectal: Deferred.  Extremities: Without clubbing, cyanosis, edema, or pulse deficit.  Neurologic: Intact without focal deficit.  Normal station and gait observed during ingress and egress from the examination room.  Skin: Without significant lesion.  Musculoskeletal: Unremarkable.    Lab/other results:    CPK normal at 105.  CMP normal except glucose 102.  Hemoglobin A1c 5.73.  NMR reveals a total cholesterol 172.  Triglycerides 74.  LDL particle number slightly elevated 1123.  Small LDL particle number excellent 484.  HDL particle number excellent at 43.6.  PSA normal at 2.2.     Assessment/Plan:       Diagnosis Plan   1. Welcome to " Medicare preventive visit     2. Impaired fasting glucose  Comprehensive Metabolic Panel    Hemoglobin A1c    Urinalysis With Microscopic If Indicated (No Culture) - Urine, Clean Catch   3. Benign essential hypertension     4. White coat syndrome with diagnosis of hypertension     5. Hyperlipidemia  Comprehensive Metabolic Panel    NMR LipoProfile    TSH    T4, Free    T3, Free   6. Statin intolerance     7. Myalgia, secondary Lipitor     8. Vitamin D deficiency  Vitamin D 25 Hydroxy   9. Family history of colon cancer  Ambulatory Referral For Screening Colonoscopy   10. Benign prostatic hyperplasia without lower urinary tract symptoms  PSA DIAGNOSTIC   11. Rising PSA level  PSA DIAGNOSTIC   12. Non morbid obesity     13. Colon cancer screening  Ambulatory Referral For Screening Colonoscopy   14. Therapeutic drug monitoring  CBC (No Diff)    Urinalysis With Microscopic If Indicated (No Culture) - Urine, Clean Catch     The welcome to Medicare visit is documented on a separate note.    Patient has mild impaired fasting glucose that does not require medication.  He is mildly overweight and I strongly encourage low carbohydrate diet, exercise, and weight loss.  His blood pressure tends to fluctuate and he does have a diagnosis of whitecoat hypertension.  Currently his blood pressure seems to be well controlled.  He takes Benicar HCT 40/12.5, 1 p.o. daily along with extended release metoprolol succinate 25 mg/day and amlodipine 5 mg/day and this seems to be working and he tolerates it well.  He has hyperlipidemia and is statin intolerant.  He is taking Zetia and this has resulted in a fairly good NMR profile.  He tolerates this well also.  Vitamin D has been in normal range.  Patient has a family history of colon cancer and is overdue for his colon cancer screening.  BPH is asymptomatic.  However, I noticed a rising PSA a few years ago and we have been monitoring this and it appears to have leveled off.  We will  continue to monitor.    Plan is as follows: I will once again order a colonoscopy.  I recommended patient go to local drugstore to obtain Shingrix vaccine.  He also needs to go to local drugstore to get influenza vaccine because we are out of stock.  Recommend low carbohydrate diet, exercise, and weight loss.  No change in current medical regimen.  I will have patient follow-up in 6 months with lab prior or follow-up as needed.    Addendum: Patient reports that  for life which is a supplement will cover the Shingrix vaccine.  Patient could obtain that here in the office after the first of the year.  He is aware to have the second 1 2 to 6 months later.    Procedures

## 2021-12-14 RX ORDER — METOPROLOL SUCCINATE 25 MG/1
TABLET, EXTENDED RELEASE ORAL
Qty: 90 TABLET | Refills: 2 | Status: SHIPPED | OUTPATIENT
Start: 2021-12-14 | End: 2022-09-06

## 2021-12-14 RX ORDER — OLMESARTAN/HYDROCHLOROTHIAZIDE 40-12.5 MG
TABLET ORAL
Qty: 90 TABLET | Refills: 2 | Status: SHIPPED | OUTPATIENT
Start: 2021-12-14 | End: 2022-09-06

## 2022-03-08 RX ORDER — EZETIMIBE 10 MG/1
TABLET ORAL
Qty: 90 TABLET | Refills: 3 | Status: SHIPPED | OUTPATIENT
Start: 2022-03-08 | End: 2023-03-03

## 2022-05-09 DIAGNOSIS — I10 BENIGN ESSENTIAL HYPERTENSION: Chronic | ICD-10-CM

## 2022-05-09 RX ORDER — AMLODIPINE BESYLATE 5 MG/1
TABLET ORAL
Qty: 90 TABLET | Refills: 3 | Status: SHIPPED | OUTPATIENT
Start: 2022-05-09

## 2022-06-23 ENCOUNTER — LAB (OUTPATIENT)
Dept: LAB | Facility: HOSPITAL | Age: 67
End: 2022-06-23

## 2022-06-23 DIAGNOSIS — E55.9 VITAMIN D DEFICIENCY: Chronic | ICD-10-CM

## 2022-06-23 DIAGNOSIS — E78.2 MIXED HYPERLIPIDEMIA: Chronic | ICD-10-CM

## 2022-06-23 DIAGNOSIS — R73.01 IMPAIRED FASTING GLUCOSE: Chronic | ICD-10-CM

## 2022-06-23 DIAGNOSIS — N40.0 BENIGN PROSTATIC HYPERPLASIA WITHOUT LOWER URINARY TRACT SYMPTOMS: Chronic | ICD-10-CM

## 2022-06-23 DIAGNOSIS — R97.20 RISING PSA LEVEL: ICD-10-CM

## 2022-06-23 DIAGNOSIS — Z51.81 THERAPEUTIC DRUG MONITORING: ICD-10-CM

## 2022-06-23 LAB
25(OH)D3 SERPL-MCNC: 39.5 NG/ML (ref 30–100)
ALBUMIN SERPL-MCNC: 4 G/DL (ref 3.5–5.2)
ALBUMIN/GLOB SERPL: 1.4 G/DL
ALP SERPL-CCNC: 53 U/L (ref 39–117)
ALT SERPL W P-5'-P-CCNC: 19 U/L (ref 1–41)
ANION GAP SERPL CALCULATED.3IONS-SCNC: 10 MMOL/L (ref 5–15)
AST SERPL-CCNC: 21 U/L (ref 1–40)
BILIRUB SERPL-MCNC: 0.5 MG/DL (ref 0–1.2)
BILIRUB UR QL STRIP: NEGATIVE
BUN SERPL-MCNC: 16 MG/DL (ref 8–23)
BUN/CREAT SERPL: 18.8 (ref 7–25)
CALCIUM SPEC-SCNC: 9.3 MG/DL (ref 8.6–10.5)
CHLORIDE SERPL-SCNC: 105 MMOL/L (ref 98–107)
CLARITY UR: CLEAR
CO2 SERPL-SCNC: 24 MMOL/L (ref 22–29)
COLOR UR: YELLOW
CREAT SERPL-MCNC: 0.85 MG/DL (ref 0.76–1.27)
DEPRECATED RDW RBC AUTO: 45.4 FL (ref 37–54)
EGFRCR SERPLBLD CKD-EPI 2021: 96.4 ML/MIN/1.73
ERYTHROCYTE [DISTWIDTH] IN BLOOD BY AUTOMATED COUNT: 13.8 % (ref 12.3–15.4)
GLOBULIN UR ELPH-MCNC: 2.8 GM/DL
GLUCOSE SERPL-MCNC: 107 MG/DL (ref 65–99)
GLUCOSE UR STRIP-MCNC: NEGATIVE MG/DL
HBA1C MFR BLD: 5.8 % (ref 4.8–5.6)
HCT VFR BLD AUTO: 40.5 % (ref 37.5–51)
HGB BLD-MCNC: 13.9 G/DL (ref 13–17.7)
HGB UR QL STRIP.AUTO: NEGATIVE
KETONES UR QL STRIP: NEGATIVE
LEUKOCYTE ESTERASE UR QL STRIP.AUTO: NEGATIVE
MCH RBC QN AUTO: 31.5 PG (ref 26.6–33)
MCHC RBC AUTO-ENTMCNC: 34.3 G/DL (ref 31.5–35.7)
MCV RBC AUTO: 91.8 FL (ref 79–97)
NITRITE UR QL STRIP: NEGATIVE
PH UR STRIP.AUTO: 7.5 [PH] (ref 5–8)
PLATELET # BLD AUTO: 204 10*3/MM3 (ref 140–450)
PMV BLD AUTO: 9.6 FL (ref 6–12)
POTASSIUM SERPL-SCNC: 4.2 MMOL/L (ref 3.5–5.2)
PROT SERPL-MCNC: 6.8 G/DL (ref 6–8.5)
PROT UR QL STRIP: NEGATIVE
PSA SERPL-MCNC: 2.75 NG/ML (ref 0–4)
RBC # BLD AUTO: 4.41 10*6/MM3 (ref 4.14–5.8)
SODIUM SERPL-SCNC: 139 MMOL/L (ref 136–145)
SP GR UR STRIP: 1.02 (ref 1–1.03)
T3FREE SERPL-MCNC: 2.95 PG/ML (ref 2–4.4)
T4 FREE SERPL-MCNC: 1.11 NG/DL (ref 0.93–1.7)
TSH SERPL DL<=0.05 MIU/L-ACNC: 3.31 UIU/ML (ref 0.27–4.2)
UROBILINOGEN UR QL STRIP: NORMAL
WBC NRBC COR # BLD: 4.47 10*3/MM3 (ref 3.4–10.8)

## 2022-06-23 PROCEDURE — 36415 COLL VENOUS BLD VENIPUNCTURE: CPT

## 2022-06-23 PROCEDURE — 84443 ASSAY THYROID STIM HORMONE: CPT

## 2022-06-23 PROCEDURE — 84153 ASSAY OF PSA TOTAL: CPT

## 2022-06-23 PROCEDURE — 80061 LIPID PANEL: CPT

## 2022-06-23 PROCEDURE — 84439 ASSAY OF FREE THYROXINE: CPT

## 2022-06-23 PROCEDURE — 82306 VITAMIN D 25 HYDROXY: CPT

## 2022-06-23 PROCEDURE — 83036 HEMOGLOBIN GLYCOSYLATED A1C: CPT

## 2022-06-23 PROCEDURE — 85027 COMPLETE CBC AUTOMATED: CPT

## 2022-06-23 PROCEDURE — 80053 COMPREHEN METABOLIC PANEL: CPT

## 2022-06-23 PROCEDURE — 83704 LIPOPROTEIN BLD QUAN PART: CPT

## 2022-06-23 PROCEDURE — 84481 FREE ASSAY (FT-3): CPT

## 2022-06-23 PROCEDURE — 81003 URINALYSIS AUTO W/O SCOPE: CPT

## 2022-06-25 LAB
CHOLEST SERPL-MCNC: 179 MG/DL (ref 100–199)
HDL SERPL-SCNC: 38.4 UMOL/L
HDLC SERPL-MCNC: 69 MG/DL
LDL SERPL QN: 21.4 NM
LDL SERPL-SCNC: 1083 NMOL/L
LDL SMALL SERPL-SCNC: 362 NMOL/L
LDLC SERPL CALC-MCNC: 98 MG/DL (ref 0–99)
TRIGL SERPL-MCNC: 62 MG/DL (ref 0–149)

## 2022-06-30 ENCOUNTER — OFFICE VISIT (OUTPATIENT)
Dept: INTERNAL MEDICINE | Facility: CLINIC | Age: 67
End: 2022-06-30

## 2022-06-30 VITALS
WEIGHT: 190.2 LBS | SYSTOLIC BLOOD PRESSURE: 122 MMHG | OXYGEN SATURATION: 99 % | HEART RATE: 67 BPM | HEIGHT: 70 IN | RESPIRATION RATE: 18 BRPM | DIASTOLIC BLOOD PRESSURE: 70 MMHG | BODY MASS INDEX: 27.23 KG/M2

## 2022-06-30 DIAGNOSIS — Z51.81 THERAPEUTIC DRUG MONITORING: ICD-10-CM

## 2022-06-30 DIAGNOSIS — E66.3 OVERWEIGHT (BMI 25.0-29.9): Chronic | ICD-10-CM

## 2022-06-30 DIAGNOSIS — Z80.0 FAMILY HISTORY OF COLON CANCER: Chronic | ICD-10-CM

## 2022-06-30 DIAGNOSIS — G25.81 RESTLESS LEG SYNDROME: ICD-10-CM

## 2022-06-30 DIAGNOSIS — N40.0 BENIGN PROSTATIC HYPERPLASIA WITHOUT LOWER URINARY TRACT SYMPTOMS: Chronic | ICD-10-CM

## 2022-06-30 DIAGNOSIS — M79.10 MYALGIA: Chronic | ICD-10-CM

## 2022-06-30 DIAGNOSIS — Z78.9 STATIN INTOLERANCE: Chronic | ICD-10-CM

## 2022-06-30 DIAGNOSIS — R73.01 IMPAIRED FASTING GLUCOSE: Primary | Chronic | ICD-10-CM

## 2022-06-30 DIAGNOSIS — E55.9 VITAMIN D DEFICIENCY: Chronic | ICD-10-CM

## 2022-06-30 DIAGNOSIS — R97.20 RISING PSA LEVEL: ICD-10-CM

## 2022-06-30 DIAGNOSIS — E78.2 MIXED HYPERLIPIDEMIA: Chronic | ICD-10-CM

## 2022-06-30 DIAGNOSIS — I10 WHITE COAT SYNDROME WITH DIAGNOSIS OF HYPERTENSION: Chronic | ICD-10-CM

## 2022-06-30 DIAGNOSIS — I10 BENIGN ESSENTIAL HYPERTENSION: Chronic | ICD-10-CM

## 2022-06-30 DIAGNOSIS — Z12.11 COLON CANCER SCREENING: ICD-10-CM

## 2022-06-30 DIAGNOSIS — Z23 NEED FOR PNEUMOCOCCAL VACCINATION: ICD-10-CM

## 2022-06-30 PROCEDURE — G0009 ADMIN PNEUMOCOCCAL VACCINE: HCPCS | Performed by: INTERNAL MEDICINE

## 2022-06-30 PROCEDURE — 99214 OFFICE O/P EST MOD 30 MIN: CPT | Performed by: INTERNAL MEDICINE

## 2022-06-30 PROCEDURE — 90677 PCV20 VACCINE IM: CPT | Performed by: INTERNAL MEDICINE

## 2022-06-30 RX ORDER — ROPINIROLE 1 MG/1
TABLET, FILM COATED ORAL
Qty: 30 TABLET | Refills: 3 | Status: SHIPPED | OUTPATIENT
Start: 2022-06-30 | End: 2022-10-26 | Stop reason: HOSPADM

## 2022-06-30 NOTE — PROGRESS NOTES
06/30/2022    Patient Information  George Chavez                                                                                          [id number] [address] [facility] [address]      1955  [unfilled]  There is no work phone number on file.    Chief Complaint:     Follow-up lab work in order to monitor chronic medical issues listed in history of present illness.  No new acute complaints.    History of Present Illness:    Patient with impaired fasting glucose, hyperlipidemia, hypertension/whitecoat syndrome, vitamin D deficiency, family history of colon cancer, statin intolerance/myalgias due to Lipitor, overweight, asymptomatic BPH, rising PSA level.  He presents today for follow-up with lab prior in order to monitor his chronic medical issues.  Past medical history reviewed and updated were necessary including health maintenance parameters.  This reveals he is up-to-date or else accounted for after today's visit with the exception of colonoscopy.  See below.  Patient also needs Prevnar 20.  Review of Systems   Constitutional: Negative.   HENT: Negative.    Eyes: Negative.    Cardiovascular: Negative.    Respiratory: Negative.    Endocrine: Negative.    Hematologic/Lymphatic: Negative.    Skin: Negative.    Musculoskeletal: Negative.    Gastrointestinal: Negative.    Genitourinary: Negative.    Neurological: Negative.    Psychiatric/Behavioral: Negative.    Allergic/Immunologic: Negative.        Active Problems:    Patient Active Problem List   Diagnosis   • Benign essential hypertension   • Lumbar scoliosis   • Hyperlipidemia   • Impaired fasting glucose   • Vitamin D deficiency   • Therapeutic drug monitoring   • Family history of colon cancer   • Myalgia, secondary Lipitor   • Multiple actinic keratoses   • Statin intolerance   • Rising PSA level   • Overweight (BMI 25.0-29.9)   • White coat syndrome with diagnosis of hypertension   • Benign prostatic hyperplasia without lower urinary tract  symptoms   • Restless leg syndrome         Past Medical History:   Diagnosis Date   • Benign essential hypertension 2/15/2016    01/28/2004--treatment for hypertension begun.   • Benign prostatic hyperplasia without lower urinary tract symptoms 6/10/2021   • Family history of colon cancer 2/16/2016    Maternal grandfather had colon cancer at age 86.   • History of Palpitations 07/12/2010 07/12/2010--24-hour Holter monitor revealed several PVCs and fewer PACs. No significant arrhythmia. Stress echocardiogram 06/06/2010 normal.   • Hyperlipidemia 2/15/2016    12/27/2010--treatment for hyperlipidemia begun.   • Impaired fasting glucose 2/15/2016    11/01/2007--initial diagnosis.   • Lumbar scoliosis 2/15/2016    09/23/2011--MRI lumbar spine revealed mild to moderate degenerative arthritis, dextro scoliosis with the apex centered around L3 and L4.   • Multiple actinic keratoses 3/23/2017    03/23/2017--1 actinic keratosis on the left forearm, 2 actinic keratoses on the right forearm, were destroyed with liquid nitrogen today.   • Myalgia, secondary Lipitor 3/23/2017    05/04/2017--patient seen in follow-up and reports return of myalgias after changing to 10 mg of Crestor daily.  He questions whether or not 5 mg per day might do the job without myalgias.  After reviewing his NMR profile which was excellent, I think this would be a good idea.  He continues to have myalgias related to statin therapy, another consideration would be Zetia.  03/23/2017--patient seen in follow-up and reports his myalgias have essentially resolved after discontinuation of Lipitor.  We will try generic Crestor 10 mg per day.  Reassess with lab in about 6 weeks.  03/02/2017--patient reports muscular aches and pains, particularly lower extremities possibly related to Lipitor, despite taking coenzyme Q 10 300 mg per day.  Lipitor discontinued.   • Non morbid obesity 7/30/2019   • Overweight (BMI 25.0-29.9) 7/30/2019   • Statin intolerance  1/30/2018   • Vitamin D deficiency 2/15/2016   • White coat syndrome with diagnosis of hypertension 2/13/2020         Past Surgical History:   Procedure Laterality Date   • COLONOSCOPY  04/11/2011 04/11/2011--normal colonoscopy.    • COLONOSCOPY  12/10/2005    12/10/2005--normal colonoscopy.   • COLONOSCOPY N/A 7/13/2016 07/13/2016--normal colonoscopy to the cecum with a good prep.   • JOINT MANIPULATION Left 8/16/2018    Procedure: LT SHOLDER MANIPULATION;  Surgeon: Mo Lemus MD;  Location: [facility] OR [facility] [place];  Service: Orthopedics   • LAPAROSCOPIC CHOLECYSTECTOMY  08/22/2011 08/22/2011--laparoscopic cholecystectomy         No Known Allergies        Current Outpatient Medications:   •  amLODIPine (NORVASC) 5 MG tablet, TAKE 1 TABLET DAILY FOR HIGH BLOOD PRESSURE AS DIRECTED, Disp: 90 tablet, Rfl: 3  •  aspirin 81 MG EC tablet, Take 81 mg by mouth Daily., Disp: , Rfl:   •  Benicar HCT 40-12.5 MG per tablet, TAKE 1 TABLET DAILY, Disp: 90 tablet, Rfl: 2  •  Cholecalciferol (VITAMIN D) 2000 units capsule, Take 2,000 Units by mouth Daily., Disp: , Rfl:   •  ezetimibe (ZETIA) 10 MG tablet, TAKE 1 TABLET EVERY EVENING, Disp: 90 tablet, Rfl: 3  •  metoprolol succinate XL (TOPROL-XL) 25 MG 24 hr tablet, TAKE 1 TABLET DAILY, Disp: 90 tablet, Rfl: 2  •  Multiple Vitamin (MULTIVITAMINS PO), Take by mouth daily., Disp: , Rfl:   •  rOPINIRole (Requip) 1 MG tablet, Take 1 pill nightly 1 hour before bedtime for restless leg syndrome, Disp: 30 tablet, Rfl: 3      Family History   Problem Relation Age of Onset   • Heart attack Mother         Mother had a Myocardial Infarction at age 81. Status post PTCA with stents.   • Heart attack Father         Father had a myocardial infarction at age 60.   • Cancer Maternal Grandfather         MGF was diagnosed with colon cancer at age 86   • Malig Hyperthermia Neg Hx          Social History     Socioeconomic History   • Marital status:    Tobacco Use   • Smoking status:  "Former Smoker     Packs/day: 0.50     Years: 18.00     Pack years: 9.00     Types: Cigarettes   • Smokeless tobacco: Never Used   • Tobacco comment: Stopped smoking at age 41.   Vaping Use   • Vaping Use: Never used   Substance and Sexual Activity   • Alcohol use: Yes     Comment: SOCIALLY   • Drug use: No   • Sexual activity: Yes     Partners: Female         Vitals:    06/30/22 0859   BP: 122/70   Pulse: 67   Resp: 18   SpO2: 99%   Weight: 86.3 kg (190 lb 3.2 oz)   Height: 177.8 cm (70\")        Body mass index is 27.29 kg/m².      Physical Exam:    General: Alert and oriented x 3.  No acute distress.  Overweight.  Normal affect.  HEENT: Pupils equal, round, reactive to light; extraocular movements intact; sclerae nonicteric; pharynx, ear canals and TMs normal.  Neck: Without JVD, thyromegaly, bruit, or adenopathy.  Lungs: Clear to auscultation in all fields.  Heart: Regular rate and rhythm without murmur, rub, gallop, or click.  Abdomen: Soft, nontender, without hepatosplenomegaly or hernia.  Bowel sounds normal.  : Deferred.  Rectal: Deferred.  Extremities: Without clubbing, cyanosis, edema, or pulse deficit.  Neurologic: Intact without focal deficit.  Normal station and gait observed during ingress and egress from the examination room.  Skin: Without significant lesion.  Musculoskeletal: Unremarkable.    Lab/other results:    CBC is normal.  CMP normal except glucose 107.  Hemoglobin A1c 5.8.  NMR reveals a total cholesterol 179, triglycerides 62, LDL particle number slightly elevated 1083, HDL particle number good at 38.4.  Urinalysis normal.  Thyroid function test normal.  PSA 2.75.    Assessment/Plan:     Diagnosis Plan   1. Impaired fasting glucose  Comprehensive Metabolic Panel    Hemoglobin A1c    Urinalysis With Microscopic If Indicated (No Culture) - Urine, Clean Catch   2. Hyperlipidemia  Comprehensive Metabolic Panel    NMR LipoProfile    TSH    T4, Free    T3, Free   3. Benign essential hypertension  "    4. White coat syndrome with diagnosis of hypertension     5. Vitamin D deficiency  Vitamin D 25 Hydroxy   6. Family history of colon cancer  Ambulatory Referral For Screening Colonoscopy   7. Statin intolerance     8. Myalgia, secondary Lipitor     9. Overweight (BMI 25.0-29.9)     10. Benign prostatic hyperplasia without lower urinary tract symptoms  PSA DIAGNOSTIC   11. Rising PSA level     12. Restless leg syndrome  rOPINIRole (Requip) 1 MG tablet   13. Therapeutic drug monitoring  CBC (No Diff)   14. Need for pneumococcal vaccination  Pneumococcal Conjugate Vaccine 20-Valent (PCV20)   15. Colon cancer screening  Ambulatory Referral For Screening Colonoscopy     Patient has mild impaired fasting glucose that does not require medication.  Patient is a little overweight and I have encouraged him to follow a low carbohydrate diet, exercise, and lose weight.  Hyperlipidemia is under good control with Zetia.  Blood pressure seems to be controlled.  Vitamin D is in normal range with supplementation.  Patient has a family history of colon cancer and is overdue for colonoscopy.  Statin intolerance overcome with Zetia.  BPH is asymptomatic.  His rising PSA has actually fallen over the past year and seems to be stable.  We will continue to monitor.    Plan is as follows: No change in current medical regimen.  Diet issues discussed.  Prevnar 20 given.  Colonoscopy reordered.  Patient will follow-up after December 9, 2022 with lab prior and this will be subsequent Medicare wellness visit.  Otherwise he will follow-up as needed.  Recommend vascular screen.    Addendum: June 30, 2022--patient reports long history of pain/achiness in his legs at night.  This only occurs at night.  No significant movement required to get relief.  Nothing seems to help.  We will try an empiric trial of medication for restless legs.  Plan is as follows: Requip titration initiated.      Procedures

## 2022-07-26 ENCOUNTER — PREP FOR SURGERY (OUTPATIENT)
Dept: OTHER | Facility: HOSPITAL | Age: 67
End: 2022-07-26

## 2022-07-26 DIAGNOSIS — Z80.0 FAMILY HISTORY OF COLON CANCER: Primary | ICD-10-CM

## 2022-09-06 RX ORDER — OLMESARTAN/HYDROCHLOROTHIAZIDE 40-12.5 MG
TABLET ORAL
Qty: 90 TABLET | Refills: 3 | Status: SHIPPED | OUTPATIENT
Start: 2022-09-06

## 2022-09-06 RX ORDER — METOPROLOL SUCCINATE 25 MG/1
TABLET, EXTENDED RELEASE ORAL
Qty: 90 TABLET | Refills: 3 | Status: SHIPPED | OUTPATIENT
Start: 2022-09-06

## 2022-10-26 ENCOUNTER — ANESTHESIA (OUTPATIENT)
Dept: GASTROENTEROLOGY | Facility: HOSPITAL | Age: 67
End: 2022-10-26

## 2022-10-26 ENCOUNTER — ANESTHESIA EVENT (OUTPATIENT)
Dept: GASTROENTEROLOGY | Facility: HOSPITAL | Age: 67
End: 2022-10-26

## 2022-10-26 ENCOUNTER — HOSPITAL ENCOUNTER (OUTPATIENT)
Facility: HOSPITAL | Age: 67
Setting detail: HOSPITAL OUTPATIENT SURGERY
Discharge: HOME | End: 2022-10-26
Attending: SURGERY | Admitting: SURGERY

## 2022-10-26 VITALS
HEART RATE: 56 BPM | SYSTOLIC BLOOD PRESSURE: 121 MMHG | RESPIRATION RATE: 16 BRPM | OXYGEN SATURATION: 95 % | TEMPERATURE: 98 F | DIASTOLIC BLOOD PRESSURE: 79 MMHG | HEIGHT: 70 IN | WEIGHT: 192.5 LBS | BODY MASS INDEX: 27.56 KG/M2

## 2022-10-26 DIAGNOSIS — Z80.0 FAMILY HISTORY OF COLON CANCER: ICD-10-CM

## 2022-10-26 PROCEDURE — 25010000002 PROPOFOL 10 MG/ML EMULSION: Performed by: ANESTHESIOLOGY

## 2022-10-26 PROCEDURE — 88305 TISSUE EXAM BY PATHOLOGIST: CPT | Performed by: SURGERY

## 2022-10-26 PROCEDURE — 45380 COLONOSCOPY AND BIOPSY: CPT | Performed by: SURGERY

## 2022-10-26 PROCEDURE — S0260 H&P FOR SURGERY: HCPCS | Performed by: SURGERY

## 2022-10-26 RX ORDER — SODIUM CHLORIDE 0.9 % (FLUSH) 0.9 %
10 SYRINGE (ML) INJECTION AS NEEDED
Status: DISCONTINUED | OUTPATIENT
Start: 2022-10-26 | End: 2022-10-26 | Stop reason: HOSPADM

## 2022-10-26 RX ORDER — SODIUM CHLORIDE 9 MG/ML
1000 INJECTION, SOLUTION INTRAVENOUS CONTINUOUS
Status: DISCONTINUED | OUTPATIENT
Start: 2022-10-26 | End: 2022-10-26

## 2022-10-26 RX ORDER — PROPOFOL 10 MG/ML
VIAL (ML) INTRAVENOUS AS NEEDED
Status: DISCONTINUED | OUTPATIENT
Start: 2022-10-26 | End: 2022-10-26 | Stop reason: SURG

## 2022-10-26 RX ORDER — PROPOFOL 10 MG/ML
VIAL (ML) INTRAVENOUS CONTINUOUS PRN
Status: DISCONTINUED | OUTPATIENT
Start: 2022-10-26 | End: 2022-10-26 | Stop reason: SURG

## 2022-10-26 RX ORDER — LIDOCAINE HYDROCHLORIDE 20 MG/ML
INJECTION, SOLUTION INFILTRATION; PERINEURAL AS NEEDED
Status: DISCONTINUED | OUTPATIENT
Start: 2022-10-26 | End: 2022-10-26 | Stop reason: SURG

## 2022-10-26 RX ORDER — SODIUM CHLORIDE, SODIUM LACTATE, POTASSIUM CHLORIDE, CALCIUM CHLORIDE 600; 310; 30; 20 MG/100ML; MG/100ML; MG/100ML; MG/100ML
1000 INJECTION, SOLUTION INTRAVENOUS CONTINUOUS
Status: DISCONTINUED | OUTPATIENT
Start: 2022-10-26 | End: 2022-10-26 | Stop reason: HOSPADM

## 2022-10-26 RX ADMIN — PROPOFOL 100 MG: 10 INJECTION, EMULSION INTRAVENOUS at 08:19

## 2022-10-26 RX ADMIN — LIDOCAINE HYDROCHLORIDE 60 MG: 20 INJECTION, SOLUTION INFILTRATION; PERINEURAL at 08:19

## 2022-10-26 RX ADMIN — SODIUM CHLORIDE, POTASSIUM CHLORIDE, SODIUM LACTATE AND CALCIUM CHLORIDE 1000 ML: 600; 310; 30; 20 INJECTION, SOLUTION INTRAVENOUS at 08:06

## 2022-10-26 RX ADMIN — PROPOFOL 50 MG: 10 INJECTION, EMULSION INTRAVENOUS at 08:21

## 2022-10-26 RX ADMIN — Medication 200 MCG/KG/MIN: at 08:19

## 2022-10-26 NOTE — ANESTHESIA POSTPROCEDURE EVALUATION
"Patient: George Chavez    Procedure Summary     Date: 10/26/22 Room / Location: [facility] ENDOSCOPY 1 /  [facility]    Anesthesia Start: 0816 Anesthesia Stop: 0838    Procedure: COLONOSCOPY TO CECUM WITH COLD BX POLYPECTOMY Diagnosis:       Family history of colon cancer      (Family history of colon cancer [Z80.0])    Surgeons: Hussein Huff MD Provider: Yo Palacio MD    Anesthesia Type: MAC ASA Status: 3          Anesthesia Type: MAC    Vitals  Vitals Value Taken Time   BP 80/64 10/26/22 0836   Temp 36.7 °C (98 °F) 10/26/22 0836   Pulse 57 10/26/22 0836   Resp 16 10/26/22 0836   SpO2 96 % 10/26/22 0836           Post Anesthesia Care and Evaluation    Patient location during evaluation: bedside  Patient participation: complete - patient participated  Level of consciousness: awake and alert  Pain management: adequate    Airway patency: patent  Anesthetic complications: No anesthetic complications    Cardiovascular status: acceptable  Respiratory status: acceptable  Hydration status: acceptable    Comments: BP (!) 80/64 (BP Location: Left arm, Patient Position: Lying)   Pulse 57   Temp 36.7 °C (98 °F) (Oral)   Resp 16   Ht 177.8 cm (70\")   Wt 87.3 kg (192 lb 8 oz)   SpO2 96%   BMI 27.62 kg/m²       "

## 2022-10-26 NOTE — ANESTHESIA PREPROCEDURE EVALUATION
Anesthesia Evaluation     Patient summary reviewed and Nursing notes reviewed   no history of anesthetic complications:  NPO Solid Status: > 8 hours  NPO Liquid Status: > 8 hours           Airway   Mallampati: II  Dental      Pulmonary - normal exam   (+) a smoker Former,   Cardiovascular - normal exam    (+) hypertension 2 medications or greater, hyperlipidemia,       Neuro/Psych- negative ROS  GI/Hepatic/Renal/Endo    (+) obesity,       Musculoskeletal     Abdominal    Substance History      OB/GYN          Other                        Anesthesia Plan    ASA 3     MAC     intravenous induction     Anesthetic plan, risks, benefits, and alternatives have been provided, discussed and informed consent has been obtained with: patient.        CODE STATUS:

## 2022-10-27 ENCOUNTER — TELEPHONE (OUTPATIENT)
Dept: SURGERY | Facility: CLINIC | Age: 67
End: 2022-10-27

## 2022-10-27 ENCOUNTER — DOCUMENTATION (OUTPATIENT)
Dept: SURGERY | Facility: CLINIC | Age: 67
End: 2022-10-27

## 2022-10-27 LAB
LAB AP CASE REPORT: NORMAL
PATH REPORT.FINAL DX SPEC: NORMAL
PATH REPORT.GROSS SPEC: NORMAL

## 2022-10-27 NOTE — TELEPHONE ENCOUNTER
----- Message from Hussein Huff MD sent at 10/27/2022  1:35 PM EDT -----  Please let him know that he had a single benign polyp.  Because the polyp was only hyperplastic and because his family history is in a second-degree relative at later age, his surveillance should be 10 years-put in computer for reminder

## 2022-10-27 NOTE — PROGRESS NOTES
ENDOSCOPY FOLLOW UP NOTE    Colonoscopy 10/26/2022    Indication:  • Screening (family history of colon cancer in paternal grandfather who was in his 80s at diagnosis)    Findings:  • Tiny ascending colon polyp: Pathology-hyperplastic    Recommendations:  • 10-year surveillance    Hussein Huff M.D.

## 2022-12-07 ENCOUNTER — TRANSCRIBE ORDERS (OUTPATIENT)
Dept: ADMINISTRATIVE | Facility: HOSPITAL | Age: 67
End: 2022-12-07

## 2022-12-07 DIAGNOSIS — Z13.6 SCREENING FOR AAA (AORTIC ABDOMINAL ANEURYSM): Primary | ICD-10-CM

## 2022-12-08 DIAGNOSIS — N40.0 BENIGN PROSTATIC HYPERPLASIA WITHOUT LOWER URINARY TRACT SYMPTOMS: Chronic | ICD-10-CM

## 2022-12-08 DIAGNOSIS — E78.2 MIXED HYPERLIPIDEMIA: Chronic | ICD-10-CM

## 2022-12-08 DIAGNOSIS — R73.01 IMPAIRED FASTING GLUCOSE: Chronic | ICD-10-CM

## 2022-12-08 DIAGNOSIS — E55.9 VITAMIN D DEFICIENCY: Chronic | ICD-10-CM

## 2022-12-08 DIAGNOSIS — Z51.81 THERAPEUTIC DRUG MONITORING: ICD-10-CM

## 2022-12-09 LAB
25(OH)D3+25(OH)D2 SERPL-MCNC: 35 NG/ML (ref 30–100)
ALBUMIN SERPL-MCNC: 4.1 G/DL (ref 3.5–5.2)
ALBUMIN/GLOB SERPL: 1.6 G/DL
ALP SERPL-CCNC: 52 U/L (ref 39–117)
ALT SERPL-CCNC: 19 U/L (ref 1–41)
APPEARANCE UR: CLEAR
AST SERPL-CCNC: 21 U/L (ref 1–40)
BILIRUB SERPL-MCNC: 0.5 MG/DL (ref 0–1.2)
BILIRUB UR QL STRIP: NEGATIVE
BUN SERPL-MCNC: 18 MG/DL (ref 8–23)
BUN/CREAT SERPL: 22.8 (ref 7–25)
CALCIUM SERPL-MCNC: 9.4 MG/DL (ref 8.6–10.5)
CHLORIDE SERPL-SCNC: 106 MMOL/L (ref 98–107)
CHOLEST SERPL-MCNC: 154 MG/DL (ref 100–199)
CO2 SERPL-SCNC: 25.4 MMOL/L (ref 22–29)
COLOR UR: YELLOW
CREAT SERPL-MCNC: 0.79 MG/DL (ref 0.76–1.27)
EGFRCR SERPLBLD CKD-EPI 2021: 98 ML/MIN/1.73
ERYTHROCYTE [DISTWIDTH] IN BLOOD BY AUTOMATED COUNT: 13.4 % (ref 12.3–15.4)
GLOBULIN SER CALC-MCNC: 2.5 GM/DL
GLUCOSE SERPL-MCNC: 102 MG/DL (ref 65–99)
GLUCOSE UR QL STRIP: NEGATIVE
HBA1C MFR BLD: 5.8 % (ref 4.8–5.6)
HCT VFR BLD AUTO: 38.8 % (ref 37.5–51)
HDL SERPL-SCNC: 36.7 UMOL/L
HDLC SERPL-MCNC: 66 MG/DL
HGB BLD-MCNC: 13 G/DL (ref 13–17.7)
HGB UR QL STRIP: NEGATIVE
KETONES UR QL STRIP: NEGATIVE
LDL SERPL QN: 21.6 NM
LDL SERPL-SCNC: 785 NMOL/L
LDL SMALL SERPL-SCNC: 224 NMOL/L
LDLC SERPL CALC-MCNC: 74 MG/DL (ref 0–99)
LEUKOCYTE ESTERASE UR QL STRIP: NEGATIVE
MCH RBC QN AUTO: 30.8 PG (ref 26.6–33)
MCHC RBC AUTO-ENTMCNC: 33.5 G/DL (ref 31.5–35.7)
MCV RBC AUTO: 91.9 FL (ref 79–97)
NITRITE UR QL STRIP: NEGATIVE
PH UR STRIP: 6 [PH] (ref 5–8)
PLATELET # BLD AUTO: 205 10*3/MM3 (ref 140–450)
POTASSIUM SERPL-SCNC: 4.3 MMOL/L (ref 3.5–5.2)
PROT SERPL-MCNC: 6.6 G/DL (ref 6–8.5)
PROT UR QL STRIP: ABNORMAL
PSA SERPL-MCNC: 3.16 NG/ML (ref 0–4)
RBC # BLD AUTO: 4.22 10*6/MM3 (ref 4.14–5.8)
SODIUM SERPL-SCNC: 141 MMOL/L (ref 136–145)
SP GR UR STRIP: 1.03 (ref 1–1.03)
T3FREE SERPL-MCNC: 3.1 PG/ML (ref 2–4.4)
T4 FREE SERPL-MCNC: 1.14 NG/DL (ref 0.93–1.7)
TRIGL SERPL-MCNC: 74 MG/DL (ref 0–149)
TSH SERPL DL<=0.005 MIU/L-ACNC: 4.16 UIU/ML (ref 0.27–4.2)
UROBILINOGEN UR STRIP-MCNC: ABNORMAL MG/DL
WBC # BLD AUTO: 5.06 10*3/MM3 (ref 3.4–10.8)

## 2023-01-17 ENCOUNTER — OFFICE VISIT (OUTPATIENT)
Dept: INTERNAL MEDICINE | Facility: CLINIC | Age: 68
End: 2023-01-17
Payer: MEDICARE

## 2023-01-17 VITALS
HEART RATE: 71 BPM | DIASTOLIC BLOOD PRESSURE: 70 MMHG | OXYGEN SATURATION: 96 % | BODY MASS INDEX: 27.23 KG/M2 | HEIGHT: 70 IN | SYSTOLIC BLOOD PRESSURE: 136 MMHG | WEIGHT: 190.2 LBS | RESPIRATION RATE: 18 BRPM

## 2023-01-17 DIAGNOSIS — R73.01 IMPAIRED FASTING GLUCOSE: Primary | Chronic | ICD-10-CM

## 2023-01-17 DIAGNOSIS — I10 BENIGN ESSENTIAL HYPERTENSION: Chronic | ICD-10-CM

## 2023-01-17 DIAGNOSIS — E55.9 VITAMIN D DEFICIENCY: Chronic | ICD-10-CM

## 2023-01-17 DIAGNOSIS — E78.2 MIXED HYPERLIPIDEMIA: Chronic | ICD-10-CM

## 2023-01-17 DIAGNOSIS — I10 WHITE COAT SYNDROME WITH DIAGNOSIS OF HYPERTENSION: Chronic | ICD-10-CM

## 2023-01-17 DIAGNOSIS — Z80.0 FAMILY HISTORY OF COLON CANCER: Chronic | ICD-10-CM

## 2023-01-17 DIAGNOSIS — Z51.81 THERAPEUTIC DRUG MONITORING: ICD-10-CM

## 2023-01-17 DIAGNOSIS — Z86.010 HISTORY OF COLON POLYPS: Chronic | ICD-10-CM

## 2023-01-17 DIAGNOSIS — N40.0 BENIGN PROSTATIC HYPERPLASIA WITHOUT LOWER URINARY TRACT SYMPTOMS: Chronic | ICD-10-CM

## 2023-01-17 DIAGNOSIS — M79.10 MYALGIA: Chronic | ICD-10-CM

## 2023-01-17 DIAGNOSIS — G25.81 RESTLESS LEG SYNDROME: ICD-10-CM

## 2023-01-17 DIAGNOSIS — R97.20 RISING PSA LEVEL: ICD-10-CM

## 2023-01-17 DIAGNOSIS — Z78.9 STATIN INTOLERANCE: Chronic | ICD-10-CM

## 2023-01-17 DIAGNOSIS — E66.3 OVERWEIGHT (BMI 25.0-29.9): Chronic | ICD-10-CM

## 2023-01-17 PROBLEM — Z86.0100 HISTORY OF COLON POLYPS: Status: ACTIVE | Noted: 2023-01-17

## 2023-01-17 PROBLEM — Z86.0100 HISTORY OF COLON POLYPS: Chronic | Status: ACTIVE | Noted: 2023-01-17

## 2023-01-17 PROCEDURE — 99214 OFFICE O/P EST MOD 30 MIN: CPT | Performed by: INTERNAL MEDICINE

## 2023-01-17 NOTE — PROGRESS NOTES
01/17/2023    Patient Information  George Chavez                                                                                          [id number] [facility] [address]      1955  [unfilled]  There is no work phone number on file.    Chief Complaint:     Follow-up multiple medical problems as noted below.  No new acute complaints.    History of Present Illness:    Patient with a history of medical problems as noted below in the assessment and plan presents today for a follow-up with lab prior in order to monitor his chronic medical issues.  His past medical history reviewed and updated were necessary including health maintenance parameters.  This reveals he is up-to-date or else accounted for although patient does need Medicare wellness visit but he does not have the time to do this today and we will therefore defer it until later in the year.    Review of Systems   Constitutional: Negative.   HENT: Negative.    Eyes: Negative.    Cardiovascular: Negative.    Respiratory: Negative.    Endocrine: Negative.    Hematologic/Lymphatic: Negative.    Skin: Negative.    Musculoskeletal: Negative.    Gastrointestinal: Negative.    Genitourinary: Negative.    Neurological: Negative.    Psychiatric/Behavioral: Negative.    Allergic/Immunologic: Negative.        Active Problems:    Patient Active Problem List   Diagnosis   • Benign essential hypertension   • Lumbar scoliosis   • Hyperlipidemia   • Impaired fasting glucose   • Vitamin D deficiency   • Therapeutic drug monitoring   • Family history of colon cancer   • Myalgia, secondary Lipitor   • Multiple actinic keratoses   • Statin intolerance   • Rising PSA level   • Overweight (BMI 25.0-29.9)   • White coat syndrome with diagnosis of hypertension   • Benign prostatic hyperplasia without lower urinary tract symptoms   • History of colon polyps, 10/26/2022--hyperplastic x1.  Endoscopist recommends 10-year surveillance.         Past Medical  History:   Diagnosis Date   • Benign essential hypertension 2/15/2016    01/28/2004--treatment for hypertension begun.   • Benign prostatic hyperplasia without lower urinary tract symptoms 6/10/2021   • Family history of colon cancer 2/16/2016    Maternal grandfather had colon cancer at age 86.   • History of colon polyps, 10/26/2022--hyperplastic x1.  Endoscopist recommends 10-year surveillance. 1/17/2023 October 26, 2022--colonoscopy revealed a polyp in the right/ascending colon.  Pathology returned hyperplastic.  Patient has a family history of colon cancer but this is a second-degree relative and the endoscopist feels screening can be every 10 years.   • History of Palpitations 07/12/2010 07/12/2010--24-hour Holter monitor revealed several PVCs and fewer PACs. No significant arrhythmia. Stress echocardiogram 06/06/2010 normal.   • Hyperlipidemia 2/15/2016    12/27/2010--treatment for hyperlipidemia begun.   • Impaired fasting glucose 2/15/2016    11/01/2007--initial diagnosis.   • Lumbar scoliosis 2/15/2016    09/23/2011--MRI lumbar spine revealed mild to moderate degenerative arthritis, dextro scoliosis with the apex centered around L3 and L4.   • Multiple actinic keratoses 3/23/2017    03/23/2017--1 actinic keratosis on the left forearm, 2 actinic keratoses on the right forearm, were destroyed with liquid nitrogen today.   • Myalgia, secondary Lipitor 3/23/2017    05/04/2017--patient seen in follow-up and reports return of myalgias after changing to 10 mg of Crestor daily.  He questions whether or not 5 mg per day might do the job without myalgias.  After reviewing his NMR profile which was excellent, I think this would be a good idea.  He continues to have myalgias related to statin therapy, another consideration would be Zetia.  03/23/2017--patient seen in follow-up and reports his myalgias have essentially resolved after discontinuation of Lipitor.  We will try generic Crestor 10 mg per day.  Reassess  with lab in about 6 weeks.  03/02/2017--patient reports muscular aches and pains, particularly lower extremities possibly related to Lipitor, despite taking coenzyme Q 10 300 mg per day.  Lipitor discontinued.   • Non morbid obesity 7/30/2019   • Overweight (BMI 25.0-29.9) 7/30/2019   • Statin intolerance 1/30/2018   • Vitamin D deficiency 2/15/2016   • White coat syndrome with diagnosis of hypertension 2/13/2020         Past Surgical History:   Procedure Laterality Date   • COLONOSCOPY  04/11/2011 04/11/2011--normal colonoscopy.    • COLONOSCOPY  12/10/2005    12/10/2005--normal colonoscopy.   • COLONOSCOPY N/A 7/13/2016 07/13/2016--normal colonoscopy to the cecum with a good prep.   • COLONOSCOPY N/A 10/26/2022    Procedure: COLONOSCOPY TO CECUM WITH COLD BX POLYPECTOMY;  Surgeon: Hussein Huff MD;  Location: [facility] ENDOSCOPY;  Service: General;  Laterality: N/A;  SURVEILLANCE  --POLYP, DIVERTICULOSIS    • JOINT MANIPULATION Left 8/16/2018    Procedure: LT SHOLDER MANIPULATION;  Surgeon: Mo Lemus MD;  Location: [facility] OR [facility] [place];  Service: Orthopedics   • LAPAROSCOPIC CHOLECYSTECTOMY  08/22/2011 08/22/2011--laparoscopic cholecystectomy         No Known Allergies        Current Outpatient Medications:   •  amLODIPine (NORVASC) 5 MG tablet, TAKE 1 TABLET DAILY FOR HIGH BLOOD PRESSURE AS DIRECTED, Disp: 90 tablet, Rfl: 3  •  aspirin 81 MG EC tablet, Take 81 mg by mouth Daily., Disp: , Rfl:   •  Benicar HCT 40-12.5 MG per tablet, TAKE 1 TABLET DAILY, Disp: 90 tablet, Rfl: 3  •  Cholecalciferol (VITAMIN D) 2000 units capsule, Take 2,000 Units by mouth Daily., Disp: , Rfl:   •  ezetimibe (ZETIA) 10 MG tablet, TAKE 1 TABLET EVERY EVENING, Disp: 90 tablet, Rfl: 3  •  metoprolol succinate XL (TOPROL-XL) 25 MG 24 hr tablet, TAKE 1 TABLET DAILY, Disp: 90 tablet, Rfl: 3  •  Multiple Vitamin (MULTIVITAMINS PO), Take by mouth daily., Disp: , Rfl:       Family History   Problem Relation Age of Onset   • Heart  "attack Mother         Mother had a Myocardial Infarction at age 81. Status post PTCA with stents.   • Heart attack Father         Father had a myocardial infarction at age 60.   • Cancer Maternal Grandfather         MGF was diagnosed with colon cancer at age 86   • Malig Hyperthermia Neg Hx          Social History     Socioeconomic History   • Marital status:    Tobacco Use   • Smoking status: Former     Packs/day: 0.50     Years: 18.00     Pack years: 9.00     Types: Cigarettes   • Smokeless tobacco: Never   • Tobacco comments:     Stopped smoking at age 41.   Vaping Use   • Vaping Use: Never used   Substance and Sexual Activity   • Alcohol use: Yes     Comment: SOCIALLY   • Drug use: No   • Sexual activity: Yes     Partners: Female         Vitals:    01/17/23 1526   BP: 136/70   Pulse: 71   Resp: 18   SpO2: 96%   Weight: 86.3 kg (190 lb 3.2 oz)   Height: 177.8 cm (70\")        Body mass index is 27.29 kg/m².      Physical Exam:    General: Alert and oriented x 3.  No acute distress.  Mildly overweight.  Normal affect.  HEENT: Pupils equal, round, reactive to light; extraocular movements intact; sclerae nonicteric; pharynx, ear canals and TMs normal.  Neck: Without JVD, thyromegaly, bruit, or adenopathy.  Lungs: Clear to auscultation in all fields.  Heart: Regular rate and rhythm without murmur, rub, gallop, or click.  Abdomen: Soft, nontender, without hepatosplenomegaly or hernia.  Bowel sounds normal.  : Deferred.  Rectal: Deferred.  Extremities: Without clubbing, cyanosis, edema, or pulse deficit.  Neurologic: Intact without focal deficit.  Normal station and gait observed during ingress and egress from the examination room.  Skin: Without significant lesion.  Musculoskeletal: Unremarkable.    Lab/other results:    I reviewed the recent colonoscopy as well as the pathology report which revealed a hyperplastic polyp in the ascending colon.    CBC is normal.  CMP normal except glucose 102.  Hemoglobin A1c " 5.8.  NMR is perfect with a total cholesterol 154, triglycerides 74, LDL particle #785, HDL particle #36.7.  Vitamin D normal at 35.  Urinalysis normal except trace protein.  Thyroid function test normal.  PSA 3.16.    Assessment/Plan:     Diagnosis Plan   1. Impaired fasting glucose  Comprehensive Metabolic Panel    Hemoglobin A1c      2. Hyperlipidemia  CK    Comprehensive Metabolic Panel    NMR LipoProfile    TSH    T4, Free    PSA DIAGNOSTIC      3. Benign essential hypertension        4. Vitamin D deficiency  Vitamin D,25-Hydroxy      5. Statin intolerance        6. Overweight (BMI 25.0-29.9)        7. White coat syndrome with diagnosis of hypertension        8. Benign prostatic hyperplasia without lower urinary tract symptoms  PSA DIAGNOSTIC      9. Rising PSA level        10. Restless leg syndrome        11. Myalgia, secondary Lipitor        12. Family history of colon cancer        13. Therapeutic drug monitoring  CBC (No Diff)      14. History of colon polyps, 10/26/2022--hyperplastic x1.  Endoscopist recommends 10-year surveillance.          Patient has extremely mild impaired fasting glucose that does not require medication.  Recommend low carbohydrate diet, exercise, and attainment of ideal body weight.  Hyperlipidemia is under perfect control with 10 mg of Zetia.  Blood pressure appears to be well controlled with amlodipine and Benicar HCT.  He also takes metoprolol 25 mg/day.  Vitamin D is in normal range with supplementation.  Noted patient is statin intolerant.  BPH is asymptomatic although patient has had a fluctuating and sometimes rising PSA level but this is never been above the normal upper range.  We will continue to monitor.  Restless legs is likely an incorrect diagnosis.  I am not sure what is causing patient's nocturnal symptoms in his legs but it does not appear to be restless legs.  I do not think it be worthwhile trying an additional medication I certainly do not want to go into  benzodiazepine treatment.  Patient has a history of colon polyps as noted above.  Repeat study 10 years.  Hyperplastic.    Plan is as follows: Patient work on diet and weight loss as noted above.  Unlikely to make any changes to his medical regimen.  I will have him follow-up in 6 months for his Medicare wellness visit.  Otherwise he will follow-up as needed.        Procedures

## 2023-03-03 RX ORDER — EZETIMIBE 10 MG/1
TABLET ORAL
Qty: 90 TABLET | Refills: 3 | Status: SHIPPED | OUTPATIENT
Start: 2023-03-03

## 2023-04-07 ENCOUNTER — HOSPITAL ENCOUNTER (OUTPATIENT)
Dept: CARDIOLOGY | Facility: HOSPITAL | Age: 68
Discharge: HOME | End: 2023-04-07
Admitting: INTERNAL MEDICINE

## 2023-04-07 VITALS
HEART RATE: 63 BPM | DIASTOLIC BLOOD PRESSURE: 78 MMHG | HEIGHT: 70 IN | WEIGHT: 191 LBS | BODY MASS INDEX: 27.35 KG/M2 | SYSTOLIC BLOOD PRESSURE: 130 MMHG

## 2023-04-07 DIAGNOSIS — Z13.6 SCREENING FOR AAA (AORTIC ABDOMINAL ANEURYSM): ICD-10-CM

## 2023-04-07 LAB
BH CV ECHO MEAS - DIST AO DIAM: 1.43 CM
BH CV VAS BP LEFT ARM: NORMAL MMHG
BH CV VAS BP RIGHT ARM: NORMAL MMHG
BH CV XLRA MEAS - MID AO DIAM: 1.76 CM
BH CV XLRA MEAS - PAD LEFT ABI DP: 1.28
BH CV XLRA MEAS - PAD LEFT ABI PT: 1.29
BH CV XLRA MEAS - PAD LEFT ARM: 123 MMHG
BH CV XLRA MEAS - PAD LEFT LEG DP: 167 MMHG
BH CV XLRA MEAS - PAD LEFT LEG PT: 168 MMHG
BH CV XLRA MEAS - PAD RIGHT ABI DP: 1.22
BH CV XLRA MEAS - PAD RIGHT ABI PT: 1.28
BH CV XLRA MEAS - PAD RIGHT ARM: 130 MMHG
BH CV XLRA MEAS - PAD RIGHT LEG DP: 158 MMHG
BH CV XLRA MEAS - PAD RIGHT LEG PT: 166 MMHG
BH CV XLRA MEAS - PROX AO DIAM: 1.92 CM
BH CV XLRA MEAS LEFT ICA/CCA RATIO: 0.89
BH CV XLRA MEAS LEFT MID CCA PSV: NORMAL CM/SEC
BH CV XLRA MEAS LEFT MID ICA PSV: NORMAL CM/SEC
BH CV XLRA MEAS LEFT PROX ECA PSV: NORMAL CM/SEC
BH CV XLRA MEAS RIGHT ICA/CCA RATIO: 0.77
BH CV XLRA MEAS RIGHT MID CCA PSV: NORMAL CM/SEC
BH CV XLRA MEAS RIGHT MID ICA PSV: NORMAL CM/SEC
BH CV XLRA MEAS RIGHT PROX ECA PSV: NORMAL CM/SEC
MAXIMAL PREDICTED HEART RATE: 154 BPM
STRESS TARGET HR: 131 BPM

## 2023-04-07 PROCEDURE — 93799 UNLISTED CV SVC/PROCEDURE: CPT

## 2023-05-03 DIAGNOSIS — I10 BENIGN ESSENTIAL HYPERTENSION: Chronic | ICD-10-CM

## 2023-05-03 RX ORDER — AMLODIPINE BESYLATE 5 MG/1
TABLET ORAL
Qty: 90 TABLET | Refills: 0 | Status: SHIPPED | OUTPATIENT
Start: 2023-05-03

## 2023-07-24 ENCOUNTER — OFFICE VISIT (OUTPATIENT)
Dept: INTERNAL MEDICINE | Facility: CLINIC | Age: 68
End: 2023-07-24
Payer: MEDICARE

## 2023-07-24 VITALS
SYSTOLIC BLOOD PRESSURE: 126 MMHG | WEIGHT: 191 LBS | HEIGHT: 70 IN | DIASTOLIC BLOOD PRESSURE: 78 MMHG | HEART RATE: 64 BPM | BODY MASS INDEX: 27.35 KG/M2 | OXYGEN SATURATION: 98 %

## 2023-07-24 DIAGNOSIS — L98.9 SKIN LESION: ICD-10-CM

## 2023-07-24 DIAGNOSIS — L57.0 MULTIPLE ACTINIC KERATOSES: Primary | Chronic | ICD-10-CM

## 2023-07-24 PROCEDURE — 1160F RVW MEDS BY RX/DR IN RCRD: CPT | Performed by: NURSE PRACTITIONER

## 2023-07-24 PROCEDURE — 3078F DIAST BP <80 MM HG: CPT | Performed by: NURSE PRACTITIONER

## 2023-07-24 PROCEDURE — 99213 OFFICE O/P EST LOW 20 MIN: CPT | Performed by: NURSE PRACTITIONER

## 2023-07-24 PROCEDURE — 1159F MED LIST DOCD IN RCRD: CPT | Performed by: NURSE PRACTITIONER

## 2023-07-24 PROCEDURE — 3074F SYST BP LT 130 MM HG: CPT | Performed by: NURSE PRACTITIONER

## 2023-07-24 NOTE — PROGRESS NOTES
"Chief Complaint  Skin Problem (Mole )    Subjective        George Chavez presents to [facility]  History of Present Illness    Patient is a pleasant 67-year-old male who typically sees Dr. Forbes here in the office.  Patient is here for a skin abnormality.    Abnormal back lesion with irregular borders.     Patient is unaware how long this mole has been there as his wife saw this abnormality and wanted him to go to his PCP to get a referral to dermatology.  Patient does have a history of  Multiple actinic keratoses.  No other problems on today's office visit.    Objective   Vital Signs:  /78   Pulse 64   Ht 177.8 cm (70\")   Wt 86.6 kg (191 lb)   SpO2 98%   BMI 27.41 kg/m²   Estimated body mass index is 27.41 kg/m² as calculated from the following:    Height as of this encounter: 177.8 cm (70\").    Weight as of this encounter: 86.6 kg (191 lb).       BMI is >= 25 and <30. (Overweight) The following options were offered after discussion;: exercise counseling/recommendations and nutrition counseling/recommendations      Physical Exam  Vitals and nursing note reviewed.   Constitutional:       Appearance: Normal appearance.   HENT:      Head: Normocephalic.      Nose: Nose normal.      Mouth/Throat:      Mouth: Mucous membranes are moist.   Eyes:      Pupils: Pupils are equal, round, and reactive to light.   Cardiovascular:      Rate and Rhythm: Normal rate and regular rhythm.      Pulses: Normal pulses.      Heart sounds: Normal heart sounds.      Comments: No peripheral edema  Pulmonary:      Effort: Pulmonary effort is normal. No respiratory distress.      Breath sounds: Normal breath sounds. No stridor. No wheezing, rhonchi or rales.      Comments: Denies shortness of breath  Chest:      Chest wall: No tenderness.   Musculoskeletal:         General: Normal range of motion.   Skin:     General: Skin is warm.      Capillary Refill: Capillary refill takes less than 2 seconds.      " Findings: Lesion present.             Comments: Mole/irregular borders   Neurological:      Mental Status: He is alert and oriented to person, place, and time.   Psychiatric:         Behavior: Behavior normal.      Result Review :    Common labs          12/8/2022    08:43   Common Labs   Glucose 102    BUN 18    Creatinine 0.79    Sodium 141    Potassium 4.3    Chloride 106    Calcium 9.4    Total Protein 6.6    Albumin 4.10    Total Bilirubin 0.5    Alkaline Phosphatase 52    AST (SGOT) 21    ALT (SGPT) 19    WBC 5.06    Hemoglobin 13.0    Hematocrit 38.8    Platelets 205    Total Cholesterol 154    Triglycerides 74    Hemoglobin A1C 5.80    PSA 3.160                   Assessment and Plan   Diagnoses and all orders for this visit:    1. Multiple actinic keratoses (Primary)  -     Ambulatory Referral to Dermatology    2. Skin lesion  -     Ambulatory Referral to Dermatology    I have placed an ambulatory referral to dermatology at this time due to his history of multiple actinic keratoses and abnormal skin lesion with irregular borders to mid back.  Patient will contact the office if he has any other questions or concerns or change in symptoms.  Patient agrees with treatment plan at this time.       I spent 20 minutes caring for George on this date of service. This time includes time spent by me in the following activities:preparing for the visit, reviewing tests, obtaining and/or reviewing a separately obtained history, performing a medically appropriate examination and/or evaluation , counseling and educating the patient/family/caregiver, ordering medications, tests, or procedures, referring and communicating with other health care professionals , documenting information in the medical record, independently interpreting results and communicating that information with the patient/family/caregiver, and care coordination  Follow Up   Return if symptoms worsen or fail to improve.  Patient was given instructions and  counseling regarding his condition or for health maintenance advice. Please see specific information pulled into the AVS if appropriate.       Answers submitted by the patient for this visit:  Primary Reason for Visit (Submitted on 7/24/2023)  What is the primary reason for your visit?: Other  Other (Submitted on 7/24/2023)  Please describe your symptoms.: Suspicious mole on back  Have you had these symptoms before?: No  How long have you been having these symptoms?: Greater than 2 weeks

## 2023-08-01 DIAGNOSIS — I10 BENIGN ESSENTIAL HYPERTENSION: Chronic | ICD-10-CM

## 2023-08-01 RX ORDER — AMLODIPINE BESYLATE 5 MG/1
TABLET ORAL
Qty: 90 TABLET | Refills: 3 | Status: SHIPPED | OUTPATIENT
Start: 2023-08-01

## 2023-08-31 RX ORDER — METOPROLOL SUCCINATE 25 MG/1
TABLET, EXTENDED RELEASE ORAL
Qty: 90 TABLET | Refills: 3 | Status: SHIPPED | OUTPATIENT
Start: 2023-08-31

## 2023-08-31 RX ORDER — OLMESARTAN MEDOXOMIL AND HYDROCHLOROTHIAZIDE 40/12.5 40; 12.5 MG/1; MG/1
TABLET ORAL
Qty: 90 TABLET | Refills: 3 | Status: SHIPPED | OUTPATIENT
Start: 2023-08-31

## 2023-09-07 ENCOUNTER — OFFICE VISIT (OUTPATIENT)
Dept: INTERNAL MEDICINE | Facility: CLINIC | Age: 68
End: 2023-09-07
Payer: MEDICARE

## 2023-09-07 VITALS
SYSTOLIC BLOOD PRESSURE: 136 MMHG | BODY MASS INDEX: 27.18 KG/M2 | WEIGHT: 189.4 LBS | OXYGEN SATURATION: 98 % | DIASTOLIC BLOOD PRESSURE: 84 MMHG | HEART RATE: 62 BPM

## 2023-09-07 DIAGNOSIS — N40.0 BENIGN PROSTATIC HYPERPLASIA WITHOUT LOWER URINARY TRACT SYMPTOMS: Chronic | ICD-10-CM

## 2023-09-07 DIAGNOSIS — R73.01 IMPAIRED FASTING GLUCOSE: Chronic | ICD-10-CM

## 2023-09-07 DIAGNOSIS — E78.2 MIXED HYPERLIPIDEMIA: Chronic | ICD-10-CM

## 2023-09-07 DIAGNOSIS — M79.10 MYALGIA: Chronic | ICD-10-CM

## 2023-09-07 DIAGNOSIS — Z78.9 STATIN INTOLERANCE: Chronic | ICD-10-CM

## 2023-09-07 DIAGNOSIS — Z51.81 THERAPEUTIC DRUG MONITORING: ICD-10-CM

## 2023-09-07 DIAGNOSIS — I10 BENIGN ESSENTIAL HYPERTENSION: Chronic | ICD-10-CM

## 2023-09-07 DIAGNOSIS — E66.3 OVERWEIGHT (BMI 25.0-29.9): Chronic | ICD-10-CM

## 2023-09-07 DIAGNOSIS — Z00.00 INITIAL MEDICARE ANNUAL WELLNESS VISIT: Primary | ICD-10-CM

## 2023-09-07 DIAGNOSIS — R97.20 RISING PSA LEVEL: ICD-10-CM

## 2023-09-07 DIAGNOSIS — Z86.16 HISTORY OF 2019 NOVEL CORONAVIRUS DISEASE (COVID-19): ICD-10-CM

## 2023-09-07 DIAGNOSIS — I10 WHITE COAT SYNDROME WITH DIAGNOSIS OF HYPERTENSION: Chronic | ICD-10-CM

## 2023-09-07 NOTE — PROGRESS NOTES
The ABCs of the Annual Wellness Visit  Initial Medicare Wellness Visit    Subjective     George Chavez is a 67 y.o. male who presents for an Initial Medicare Wellness Visit.    The following portions of the patient's history were reviewed and   updated as appropriate: allergies, current medications, past family history, past medical history, past social history, past surgical history, and problem list.     Compared to one year ago, the patient feels his physical   health is the same.    Compared to one year ago, the patient feels his mental   health is the same.    Recent Hospitalizations:  He was not admitted to the hospital during the last year.       Current Medical Providers:  Patient Care Team:  Taj Forbes MD as PCP - General (Internal Medicine)    Outpatient Medications Prior to Visit   Medication Sig Dispense Refill    amLODIPine (NORVASC) 5 MG tablet TAKE 1 TABLET DAILY FOR HIGH BLOOD PRESSURE AS DIRECTED 90 tablet 3    aspirin 81 MG EC tablet Take 1 tablet by mouth Daily.      Cholecalciferol (VITAMIN D) 2000 units capsule Take 1 capsule by mouth Daily.      ezetimibe (ZETIA) 10 MG tablet TAKE 1 TABLET EVERY EVENING 90 tablet 3    metoprolol succinate XL (TOPROL-XL) 25 MG 24 hr tablet TAKE 1 TABLET DAILY 90 tablet 3    Multiple Vitamin (MULTIVITAMINS PO) Take by mouth daily.      olmesartan-hydrochlorothiazide (BENICAR HCT) 40-12.5 MG per tablet TAKE 1 TABLET DAILY 90 tablet 3     No facility-administered medications prior to visit.       No opioid medication identified on active medication list. I have reviewed chart for other potential  high risk medication/s and harmful drug interactions in the elderly.        Aspirin is on active medication list. Aspirin use is indicated based on review of current medical condition/s. Pros and cons of this therapy have been discussed today. Benefits of this medication outweigh potential harm.  Patient has been encouraged to continue taking this medication.   ".      Patient Active Problem List   Diagnosis    Benign essential hypertension    Lumbar scoliosis    Hyperlipidemia    Impaired fasting glucose    Vitamin D deficiency    Therapeutic drug monitoring    Family history of colon cancer    Myalgia, secondary Lipitor    Multiple actinic keratoses    Statin intolerance    Rising PSA level    Overweight (BMI 25.0-29.9)    White coat syndrome with diagnosis of hypertension    Benign prostatic hyperplasia without lower urinary tract symptoms    History of colon polyps, 10/26/2022--hyperplastic x1.  Endoscopist recommends 10-year surveillance.    History of 2019 novel coronavirus disease (COVID-19)     Advance Care Planning   Advance Care Planning     Advance Directive is not on file.  ACP discussion was held with the patient during this visit. Patient does not have an advance directive, information provided.       Objective    Vitals:    23 0855   BP: 136/84   BP Location: Left arm   Patient Position: Sitting   Cuff Size: Adult   Pulse: 62   SpO2: 98%   Weight: 85.9 kg (189 lb 6.4 oz)     Estimated body mass index is 27.18 kg/m² as calculated from the following:    Height as of 23: 177.8 cm (70\").    Weight as of this encounter: 85.9 kg (189 lb 6.4 oz).           Does the patient have evidence of cognitive impairment?   No          HEALTH RISK ASSESSMENT    Smoking Status:  Social History     Tobacco Use   Smoking Status Former    Packs/day: 0.50    Years: 18.00    Pack years: 9.00    Types: Cigarettes    Quit date:     Years since quittin.6   Smokeless Tobacco Never   Tobacco Comments    Stopped smoking at age 41.     Alcohol Consumption:  Social History     Substance and Sexual Activity   Alcohol Use Yes    Comment: SOCIALLY     Fall Risk Screen:    STEADI Fall Risk Assessment was completed, and patient is at LOW risk for falls.Assessment completed on:2023    Depression Screen:       2023     3:55 PM   PHQ-2/PHQ-9 Depression Screening "   Little Interest or Pleasure in Doing Things 0-->not at all   Feeling Down, Depressed or Hopeless 0-->not at all   PHQ-9: Brief Depression Severity Measure Score 0       Health Habits and Functional and Cognitive Screenin/7/2023     8:59 AM   Functional & Cognitive Status   Do you have difficulty preparing food and eating? No   Do you have difficulty bathing yourself, getting dressed or grooming yourself? No   Do you have difficulty using the toilet? No   Do you have difficulty moving around from place to place? No   Do you have trouble with steps or getting out of a bed or a chair? No   Current Diet Well Balanced Diet   Dental Exam Up to date   Eye Exam Up to date   Exercise (times per week) 5 times per week   Current Exercises Include Walking   Do you need help using the phone?  No   Are you deaf or do you have serious difficulty hearing?  No   Do you need help to go to places out of walking distance? No   Do you need help shopping? No   Do you need help preparing meals?  No   Do you need help with housework?  No   Do you need help with laundry? No   Do you need help taking your medications? No   Do you need help managing money? No   Do you ever drive or ride in a car without wearing a seat belt? No   Have you felt unusual stress, anger or loneliness in the last month? No   Who do you live with? Spouse   If you need help, do you have trouble finding someone available to you? No   Have you been bothered in the last four weeks by sexual problems? No   Do you have difficulty concentrating, remembering or making decisions? No       Age-appropriate Screening Schedule:  Refer to the list below for future screening recommendations based on patient's age, sex and/or medical conditions. Orders for these recommended tests are listed in the plan section. The patient has been provided with a written plan.    Health Maintenance   Topic Date Due    INFLUENZA VACCINE  10/01/2023    LIPID PANEL  2023    BMI  FOLLOWUP  07/24/2024    ANNUAL WELLNESS VISIT  09/07/2024    TDAP/TD VACCINES (2 - Td or Tdap) 03/02/2027    COLORECTAL CANCER SCREENING  10/26/2032    HEPATITIS C SCREENING  Completed    Pneumococcal Vaccine 65+  Completed    AAA SCREEN (ONE-TIME)  Completed    ZOSTER VACCINE  Completed    COVID-19 Vaccine  Discontinued          CMS Preventative Services Quick Reference  Risk Factors Identified During Encounter    Immunizations Discussed/Encouraged: Influenza and COVID19    The above risks/problems have been discussed with the patient.  Pertinent information has been shared with the patient in the After Visit Summary.  An After Visit Summary and PPPS were made available to the patient.  Diagnoses and all orders for this visit:    1. Initial Medicare annual wellness visit (Primary)    2. Impaired fasting glucose  -     Urinalysis With Microscopic If Indicated (No Culture) - Urine, Clean Catch    3. Hyperlipidemia    4. Benign prostatic hyperplasia without lower urinary tract symptoms    5. Benign essential hypertension  -     Urinalysis With Microscopic If Indicated (No Culture) - Urine, Clean Catch    6. Statin intolerance    7. White coat syndrome with diagnosis of hypertension    8. Overweight (BMI 25.0-29.9)    9. Myalgia, secondary Lipitor    10. Rising PSA level    11. Therapeutic drug monitoring    12. History of 2019 novel coronavirus disease (COVID-19)  -     SARS-CoV-2 Antibodies, Nucleocapsid (Natural Immunity)      Follow Up:  Next Medicare Wellness visit to be scheduled in 1 year.

## 2024-05-14 RX ORDER — EZETIMIBE 10 MG/1
TABLET ORAL
Qty: 90 TABLET | Refills: 0 | Status: SHIPPED | OUTPATIENT
Start: 2024-05-14

## 2024-08-13 ENCOUNTER — OFFICE VISIT (OUTPATIENT)
Dept: INTERNAL MEDICINE | Facility: CLINIC | Age: 69
End: 2024-08-13
Payer: MEDICARE

## 2024-08-13 VITALS
OXYGEN SATURATION: 97 % | RESPIRATION RATE: 16 BRPM | BODY MASS INDEX: 27.2 KG/M2 | TEMPERATURE: 97.8 F | SYSTOLIC BLOOD PRESSURE: 122 MMHG | DIASTOLIC BLOOD PRESSURE: 72 MMHG | HEIGHT: 70 IN | HEART RATE: 82 BPM | WEIGHT: 190 LBS

## 2024-08-13 DIAGNOSIS — H93.93 PROBLEM OF BOTH EARS: ICD-10-CM

## 2024-08-13 DIAGNOSIS — H93.8X3 CONGESTION OF BOTH EARS: Primary | ICD-10-CM

## 2024-08-13 LAB
EXPIRATION DATE: NORMAL
EXPIRATION DATE: NORMAL
FLUAV AG UPPER RESP QL IA.RAPID: NOT DETECTED
FLUBV AG UPPER RESP QL IA.RAPID: NOT DETECTED
INTERNAL CONTROL: NORMAL
INTERNAL CONTROL: NORMAL
Lab: NORMAL
Lab: NORMAL
S PYO AG THROAT QL: NEGATIVE
SARS-COV-2 AG UPPER RESP QL IA.RAPID: NOT DETECTED

## 2024-08-13 PROCEDURE — 3074F SYST BP LT 130 MM HG: CPT | Performed by: NURSE PRACTITIONER

## 2024-08-13 PROCEDURE — 1159F MED LIST DOCD IN RCRD: CPT | Performed by: NURSE PRACTITIONER

## 2024-08-13 PROCEDURE — 1160F RVW MEDS BY RX/DR IN RCRD: CPT | Performed by: NURSE PRACTITIONER

## 2024-08-13 PROCEDURE — 3078F DIAST BP <80 MM HG: CPT | Performed by: NURSE PRACTITIONER

## 2024-08-13 PROCEDURE — 99213 OFFICE O/P EST LOW 20 MIN: CPT | Performed by: NURSE PRACTITIONER

## 2024-08-13 PROCEDURE — 87880 STREP A ASSAY W/OPTIC: CPT | Performed by: NURSE PRACTITIONER

## 2024-08-13 PROCEDURE — 1125F AMNT PAIN NOTED PAIN PRSNT: CPT | Performed by: NURSE PRACTITIONER

## 2024-08-13 PROCEDURE — 87428 SARSCOV & INF VIR A&B AG IA: CPT | Performed by: NURSE PRACTITIONER

## 2024-08-13 NOTE — PROGRESS NOTES
"Chief Complaint  [facility] [place] and Sore Throat (10 days)    Subjective        George Chavez presents to [facility]  History of Present Illness  History of Present Illness  The patient presents for evaluation of blocked eustachian tubes.       He reports experiencing blocked eustachian tubes in both ears, a condition he has encountered before. Initially, he managed these symptoms daily, waited for a significant period, and received a referral to Dr. Patel, which he never saw. After his tubes successfully cleared. However, he did not follow up with Jorge as the condition had resolved.        His current symptoms, similar to those he experienced during the COVID-19 pandemic, include mild cold symptoms, occasional sore throat at night, and intermittent ear pain. He has been prescribed Flonase by Dr. Galvan, which he continues to use. he also tried Sudafed for 5 days and amoxicillin, but to no avail. Dr. Galvan diagnosed him with an ear infection on the left side, but 3 days later, the infection spread to both sides. he completed a 10-day course of amoxicillin 875 mg on the 9th. he has been performing exercises, including Valsalva maneuver, but experiences muffled hearing afterwards. he has Allegra at home, but has not started using it. A previous prescription for a decongestant by Dr. Forbes at the end of 2019 provided some relief.       Objective   Vital Signs:  /72   Pulse 82   Temp 97.8 °F (36.6 °C)   Resp 16   Ht 177.8 cm (70\")   Wt 86.2 kg (190 lb)   SpO2 97%   BMI 27.26 kg/m²   Estimated body mass index is 27.26 kg/m² as calculated from the following:    Height as of this encounter: 177.8 cm (70\").    Weight as of this encounter: 86.2 kg (190 lb).       BMI is >= 25 and <30. (Overweight) The following options were offered after discussion;: exercise counseling/recommendations and nutrition counseling/recommendations      Physical Exam  Vitals and nursing note " reviewed.   Constitutional:       Appearance: Normal appearance.   HENT:      Head: Normocephalic.      Right Ear: Tympanic membrane, ear canal and external ear normal. There is no impacted cerumen.      Left Ear: Tympanic membrane, ear canal and external ear normal. There is no impacted cerumen.      Ears:      Comments: Muffled hearing with sure behind the TMs ongoing     Nose: Congestion present.      Mouth/Throat:      Mouth: Mucous membranes are moist.      Pharynx: No oropharyngeal exudate or posterior oropharyngeal erythema.   Eyes:      Pupils: Pupils are equal, round, and reactive to light.   Cardiovascular:      Rate and Rhythm: Normal rate and regular rhythm.      Pulses: Normal pulses.      Heart sounds: Normal heart sounds.      Comments: No peripheral  Pulmonary:      Effort: Pulmonary effort is normal. No respiratory distress.      Breath sounds: Normal breath sounds. No stridor. No wheezing, rhonchi or rales.      Comments: Denies shortness of breath  Chest:      Chest wall: No tenderness.   Skin:     General: Skin is warm.      Capillary Refill: Capillary refill takes less than 2 seconds.   Neurological:      Mental Status: He is alert and oriented to person, place, and time.   Psychiatric:         Behavior: Behavior normal.        Physical Exam  Both ears show no cerumen. Left ear is retracted and red in a previous note from Dr. Rasmussen but appears normal on exam today.  Right ear TM appears normal, but there is a small spot on the external canal that seems inflamed and red.     Result Review :      Common labs          9/7/2023    09:27   Common Labs   Glucose 105    BUN 15    Creatinine 0.80    Sodium 140    Potassium 4.5    Chloride 106    Calcium 9.7    Total Protein 7.0    Albumin 4.2    Total Bilirubin 0.5    Alkaline Phosphatase 60    AST (SGOT) 25    ALT (SGPT) 24    WBC 4.57    Hemoglobin 14.6    Hematocrit 42.8    Platelets 217    Total Cholesterol 177    Triglycerides 59    Hemoglobin A1C  5.60    PSA 3.350        Results  UC with Dustin Rasmussen MD (08/03/2024)       Laboratory Studies  Covid test: Negative. Flu test: Negative. Strep test: Negative.              Assessment and Plan     Diagnoses and all orders for this visit:    1. Congestion of both ears (Primary)  -     POCT SARS-CoV-2 Antigen DONALD + Flu  -     POCT rapid strep A  -     Ambulatory Referral to ENT (Otolaryngology)    2. Problem of both ears  -     Ambulatory Referral to ENT (Otolaryngology)    Other orders  -     Chlorcyclizine-Pseudoephed 25-60 MG tablet; Take 1 tablet by mouth 2 (Two) Times a Day for 14 days.  Dispense: 42 tablet; Refill: 1      Assessment & Plan  1. Blocked eustachian tubes.  Both ears appear healthy with no signs of infection. His blood pressure and pulse are within normal limits. An urgent referral to an ENT specialist has been made. he is advised to take half a tablet of Stahist twice daily as needed. The use of Flonase is advised. Valsalva maneuvers are recommended but not excessively. Should he not receive  a call from the ENT specialist within 1 week, he is advised to contact us.         I spent 30 minutes caring for George on this date of service. This time includes time spent by me in the following activities:preparing for the visit, reviewing tests, obtaining and/or reviewing a separately obtained history, performing a medically appropriate examination and/or evaluation , counseling and educating the patient/family/caregiver, ordering medications, tests, or procedures, referring and communicating with other health care professionals , documenting information in the medical record, independently interpreting results and communicating that information with the patient/family/caregiver, and care coordination  Follow Up     Return if symptoms worsen or fail to improve.  Patient was given instructions and counseling regarding his condition or for health maintenance advice. Please see specific information  pulled into the AVS if appropriate.     Patient or patient representative verbalized consent for the use of Ambient Listening during the visit with  DENNY Rabago for chart documentation. 8/13/2024  13:30 EDT

## 2024-08-20 DIAGNOSIS — I10 BENIGN ESSENTIAL HYPERTENSION: Chronic | ICD-10-CM

## 2024-08-20 RX ORDER — EZETIMIBE 10 MG/1
TABLET ORAL
Qty: 90 TABLET | Refills: 3 | Status: SHIPPED | OUTPATIENT
Start: 2024-08-20

## 2024-08-20 RX ORDER — AMLODIPINE BESYLATE 5 MG/1
TABLET ORAL
Qty: 90 TABLET | Refills: 3 | Status: SHIPPED | OUTPATIENT
Start: 2024-08-20

## 2024-09-10 ENCOUNTER — OFFICE VISIT (OUTPATIENT)
Dept: INTERNAL MEDICINE | Facility: CLINIC | Age: 69
End: 2024-09-10
Payer: MEDICARE

## 2024-09-10 ENCOUNTER — LAB (OUTPATIENT)
Dept: LAB | Facility: HOSPITAL | Age: 69
End: 2024-09-10
Payer: MEDICARE

## 2024-09-10 VITALS
RESPIRATION RATE: 16 BRPM | HEIGHT: 70 IN | TEMPERATURE: 97 F | OXYGEN SATURATION: 98 % | HEART RATE: 74 BPM | DIASTOLIC BLOOD PRESSURE: 72 MMHG | WEIGHT: 190 LBS | SYSTOLIC BLOOD PRESSURE: 130 MMHG | BODY MASS INDEX: 27.2 KG/M2

## 2024-09-10 DIAGNOSIS — Z00.00 ENCOUNTER FOR SUBSEQUENT ANNUAL WELLNESS VISIT (AWV) IN MEDICARE PATIENT: Primary | ICD-10-CM

## 2024-09-10 DIAGNOSIS — Z86.010 HISTORY OF COLON POLYPS: Chronic | ICD-10-CM

## 2024-09-10 DIAGNOSIS — Z51.81 THERAPEUTIC DRUG MONITORING: ICD-10-CM

## 2024-09-10 DIAGNOSIS — R97.20 RISING PSA LEVEL: ICD-10-CM

## 2024-09-10 DIAGNOSIS — Z78.9 STATIN INTOLERANCE: Chronic | ICD-10-CM

## 2024-09-10 DIAGNOSIS — Z86.16 HISTORY OF 2019 NOVEL CORONAVIRUS DISEASE (COVID-19): Chronic | ICD-10-CM

## 2024-09-10 DIAGNOSIS — E78.2 MIXED HYPERLIPIDEMIA: Chronic | ICD-10-CM

## 2024-09-10 DIAGNOSIS — N40.0 BENIGN PROSTATIC HYPERPLASIA WITHOUT LOWER URINARY TRACT SYMPTOMS: Chronic | ICD-10-CM

## 2024-09-10 DIAGNOSIS — M79.10 MYALGIA: Chronic | ICD-10-CM

## 2024-09-10 DIAGNOSIS — R73.01 IMPAIRED FASTING GLUCOSE: Chronic | ICD-10-CM

## 2024-09-10 DIAGNOSIS — E66.3 OVERWEIGHT (BMI 25.0-29.9): Chronic | ICD-10-CM

## 2024-09-10 DIAGNOSIS — E55.9 VITAMIN D DEFICIENCY: Chronic | ICD-10-CM

## 2024-09-10 DIAGNOSIS — Z80.0 FAMILY HISTORY OF COLON CANCER: Chronic | ICD-10-CM

## 2024-09-10 DIAGNOSIS — I10 BENIGN ESSENTIAL HYPERTENSION: Chronic | ICD-10-CM

## 2024-09-10 DIAGNOSIS — L57.0 MULTIPLE ACTINIC KERATOSES: Chronic | ICD-10-CM

## 2024-09-10 PROBLEM — H93.8X3 CONGESTION OF BOTH EARS: Status: RESOLVED | Noted: 2024-08-13 | Resolved: 2024-09-10

## 2024-09-10 LAB
25(OH)D3 SERPL-MCNC: 40.9 NG/ML (ref 30–100)
ALBUMIN SERPL-MCNC: 3.7 G/DL (ref 3.5–5.2)
ALBUMIN/GLOB SERPL: 1.1 G/DL
ALP SERPL-CCNC: 58 U/L (ref 39–117)
ALT SERPL W P-5'-P-CCNC: 23 U/L (ref 1–41)
ANION GAP SERPL CALCULATED.3IONS-SCNC: 8.7 MMOL/L (ref 5–15)
AST SERPL-CCNC: 24 U/L (ref 1–40)
BILIRUB SERPL-MCNC: 0.3 MG/DL (ref 0–1.2)
BILIRUB UR QL STRIP: NEGATIVE
BUN SERPL-MCNC: 15 MG/DL (ref 8–23)
BUN/CREAT SERPL: 18.1 (ref 7–25)
CALCIUM SPEC-SCNC: 9.1 MG/DL (ref 8.6–10.5)
CHLORIDE SERPL-SCNC: 104 MMOL/L (ref 98–107)
CLARITY UR: CLEAR
CO2 SERPL-SCNC: 24.3 MMOL/L (ref 22–29)
COLOR UR: YELLOW
CREAT SERPL-MCNC: 0.83 MG/DL (ref 0.76–1.27)
DEPRECATED RDW RBC AUTO: 45.2 FL (ref 37–54)
EGFRCR SERPLBLD CKD-EPI 2021: 95.9 ML/MIN/1.73
ERYTHROCYTE [DISTWIDTH] IN BLOOD BY AUTOMATED COUNT: 13.5 % (ref 12.3–15.4)
GLOBULIN UR ELPH-MCNC: 3.3 GM/DL
GLUCOSE SERPL-MCNC: 105 MG/DL (ref 65–99)
GLUCOSE UR STRIP-MCNC: NEGATIVE MG/DL
HBA1C MFR BLD: 5.7 % (ref 4.8–5.6)
HCT VFR BLD AUTO: 38.8 % (ref 37.5–51)
HGB BLD-MCNC: 13.1 G/DL (ref 13–17.7)
HGB UR QL STRIP.AUTO: NEGATIVE
KETONES UR QL STRIP: NEGATIVE
LEUKOCYTE ESTERASE UR QL STRIP.AUTO: NEGATIVE
MCH RBC QN AUTO: 30.7 PG (ref 26.6–33)
MCHC RBC AUTO-ENTMCNC: 33.8 G/DL (ref 31.5–35.7)
MCV RBC AUTO: 90.9 FL (ref 79–97)
NITRITE UR QL STRIP: NEGATIVE
PH UR STRIP.AUTO: 7 [PH] (ref 5–8)
PLATELET # BLD AUTO: 206 10*3/MM3 (ref 140–450)
PMV BLD AUTO: 9.4 FL (ref 6–12)
POTASSIUM SERPL-SCNC: 4.5 MMOL/L (ref 3.5–5.2)
PROT SERPL-MCNC: 7 G/DL (ref 6–8.5)
PROT UR QL STRIP: NEGATIVE
PSA SERPL-MCNC: 3.48 NG/ML (ref 0–4)
RBC # BLD AUTO: 4.27 10*6/MM3 (ref 4.14–5.8)
SODIUM SERPL-SCNC: 137 MMOL/L (ref 136–145)
SP GR UR STRIP: 1.01 (ref 1–1.03)
T3FREE SERPL-MCNC: 2.68 PG/ML (ref 2–4.4)
T4 FREE SERPL-MCNC: 1.13 NG/DL (ref 0.92–1.68)
TSH SERPL DL<=0.05 MIU/L-ACNC: 2.41 UIU/ML (ref 0.27–4.2)
UROBILINOGEN UR QL STRIP: NORMAL
WBC NRBC COR # BLD AUTO: 4.21 10*3/MM3 (ref 3.4–10.8)

## 2024-09-10 PROCEDURE — 82306 VITAMIN D 25 HYDROXY: CPT | Performed by: INTERNAL MEDICINE

## 2024-09-10 PROCEDURE — 84153 ASSAY OF PSA TOTAL: CPT | Performed by: INTERNAL MEDICINE

## 2024-09-10 PROCEDURE — 80061 LIPID PANEL: CPT | Performed by: INTERNAL MEDICINE

## 2024-09-10 PROCEDURE — 1170F FXNL STATUS ASSESSED: CPT | Performed by: INTERNAL MEDICINE

## 2024-09-10 PROCEDURE — 84439 ASSAY OF FREE THYROXINE: CPT | Performed by: INTERNAL MEDICINE

## 2024-09-10 PROCEDURE — 1125F AMNT PAIN NOTED PAIN PRSNT: CPT | Performed by: INTERNAL MEDICINE

## 2024-09-10 PROCEDURE — 81003 URINALYSIS AUTO W/O SCOPE: CPT | Performed by: INTERNAL MEDICINE

## 2024-09-10 PROCEDURE — 83036 HEMOGLOBIN GLYCOSYLATED A1C: CPT | Performed by: INTERNAL MEDICINE

## 2024-09-10 PROCEDURE — 3078F DIAST BP <80 MM HG: CPT | Performed by: INTERNAL MEDICINE

## 2024-09-10 PROCEDURE — 1160F RVW MEDS BY RX/DR IN RCRD: CPT | Performed by: INTERNAL MEDICINE

## 2024-09-10 PROCEDURE — 36415 COLL VENOUS BLD VENIPUNCTURE: CPT | Performed by: INTERNAL MEDICINE

## 2024-09-10 PROCEDURE — 80053 COMPREHEN METABOLIC PANEL: CPT | Performed by: INTERNAL MEDICINE

## 2024-09-10 PROCEDURE — 84481 FREE ASSAY (FT-3): CPT | Performed by: INTERNAL MEDICINE

## 2024-09-10 PROCEDURE — 3075F SYST BP GE 130 - 139MM HG: CPT | Performed by: INTERNAL MEDICINE

## 2024-09-10 PROCEDURE — 83704 LIPOPROTEIN BLD QUAN PART: CPT | Performed by: INTERNAL MEDICINE

## 2024-09-10 PROCEDURE — 85027 COMPLETE CBC AUTOMATED: CPT | Performed by: INTERNAL MEDICINE

## 2024-09-10 PROCEDURE — 1159F MED LIST DOCD IN RCRD: CPT | Performed by: INTERNAL MEDICINE

## 2024-09-10 PROCEDURE — G0439 PPPS, SUBSEQ VISIT: HCPCS | Performed by: INTERNAL MEDICINE

## 2024-09-10 PROCEDURE — 86769 SARS-COV-2 COVID-19 ANTIBODY: CPT | Performed by: INTERNAL MEDICINE

## 2024-09-10 PROCEDURE — 84443 ASSAY THYROID STIM HORMONE: CPT | Performed by: INTERNAL MEDICINE

## 2024-09-10 NOTE — PROGRESS NOTES
Subjective   The ABCs of the Annual Wellness Visit  Medicare Wellness Visit      George Chavez is a 68 y.o. patient who presents for a Medicare Wellness Visit.    The following portions of the patient's history were reviewed and   updated as appropriate: allergies, current medications, past family history, past medical history, past social history, past surgical history, and problem list.    Compared to one year ago, the patient's physical   health is better.  Compared to one year ago, the patient's mental   health is the same.    Recent Hospitalizations:  He was not admitted to the hospital during the last year.     Current Medical Providers:  Patient Care Team:  Taj Forbes MD as PCP - General (Internal Medicine)    Outpatient Medications Prior to Visit   Medication Sig Dispense Refill    amLODIPine (NORVASC) 5 MG tablet TAKE 1 TABLET DAILY FOR HIGH BLOOD PRESSURE AS DIRECTED 90 tablet 3    aspirin 81 MG EC tablet Take 1 tablet by mouth Daily.      Cholecalciferol (VITAMIN D) 2000 units capsule Take 1 capsule by mouth Daily.      ezetimibe (ZETIA) 10 MG tablet TAKE 1 TABLET EVERY EVENING 90 tablet 3    metoprolol succinate XL (TOPROL-XL) 25 MG 24 hr tablet TAKE 1 TABLET DAILY 90 tablet 3    Multiple Vitamin (MULTIVITAMINS PO) Take by mouth daily.      olmesartan-hydrochlorothiazide (BENICAR HCT) 40-12.5 MG per tablet TAKE 1 TABLET DAILY 90 tablet 3     No facility-administered medications prior to visit.     No opioid medication identified on active medication list. I have reviewed chart for other potential  high risk medication/s and harmful drug interactions in the elderly.      Aspirin is on active medication list. Aspirin use is indicated based on review of current medical condition/s. Pros and cons of this therapy have been discussed today. Benefits of this medication outweigh potential harm.  Patient has been encouraged to continue taking this medication.  .      Patient Active Problem List  "  Diagnosis    Benign essential hypertension    Lumbar scoliosis    Hyperlipidemia    Impaired fasting glucose    Vitamin D deficiency    Therapeutic drug monitoring    Family history of colon cancer    Myalgia, secondary Lipitor    Multiple actinic keratoses    Statin intolerance    Rising PSA level    Overweight (BMI 25.0-29.9)    White coat syndrome with diagnosis of hypertension    Benign prostatic hyperplasia without lower urinary tract symptoms    History of colon polyps, 10/26/2022--hyperplastic x1.  Endoscopist recommends 10-year surveillance.    History of 2019 novel coronavirus disease (COVID-19)     Advance Care Planning Advance Directive is not on file.  ACP discussion was held with the patient during this visit. Patient does not have an advance directive, information provided.            Objective   Vitals:    09/10/24 0910   BP: 130/72   Pulse: 74   Resp: 16   Temp: 97 °F (36.1 °C)   TempSrc: Temporal   SpO2: 98%   Weight: 86.2 kg (190 lb)   Height: 177.8 cm (70\")       Estimated body mass index is 27.26 kg/m² as calculated from the following:    Height as of this encounter: 177.8 cm (70\").    Weight as of this encounter: 86.2 kg (190 lb).            Does the patient have evidence of cognitive impairment? No                                                                                               Health  Risk Assessment    Smoking Status:  Social History     Tobacco Use   Smoking Status Former    Current packs/day: 0.00    Average packs/day: 0.5 packs/day for 18.0 years (9.0 ttl pk-yrs)    Types: Cigarettes    Start date: 1980    Quit date:     Years since quittin.7   Smokeless Tobacco Never   Tobacco Comments    Stopped smoking at age 41.     Alcohol Consumption:  Social History     Substance and Sexual Activity   Alcohol Use Yes    Alcohol/week: 6.0 standard drinks of alcohol    Types: 3 Cans of beer, 3 Drinks containing 0.5 oz of alcohol per week    Comment: SOCIALLY       Fall Risk " Screen  STEADI Fall Risk Assessment was completed, and patient is at LOW risk for falls.Assessment completed on:9/10/2024    Depression Screenin/10/2024     9:12 AM   PHQ-2/PHQ-9 Depression Screening   Little Interest or Pleasure in Doing Things 0-->not at all   Feeling Down, Depressed or Hopeless 0-->not at all   PHQ-9: Brief Depression Severity Measure Score 0     Health Habits and Functional and Cognitive Screenin/10/2024     9:12 AM   Functional & Cognitive Status   Do you have difficulty preparing food and eating? No   Do you have difficulty bathing yourself, getting dressed or grooming yourself? No   Do you have difficulty using the toilet? No   Do you have difficulty moving around from place to place? No   Do you have trouble with steps or getting out of a bed or a chair? No   Current Diet Well Balanced Diet   Dental Exam Up to date   Eye Exam Up to date   Exercise (times per week) 5 times per week   Current Exercises Include Running/Jogging;Weightlifting   Do you need help using the phone?  No   Are you deaf or do you have serious difficulty hearing?  No   Do you need help to go to places out of walking distance? No   Do you need help shopping? No   Do you need help preparing meals?  No   Do you need help with housework?  No   Do you need help with laundry? No   Do you need help taking your medications? No   Do you need help managing money? No   Do you ever drive or ride in a car without wearing a seat belt? No   Have you felt unusual stress, anger or loneliness in the last month? No   Who do you live with? Spouse   If you need help, do you have trouble finding someone available to you? No   Have you been bothered in the last four weeks by sexual problems? No   Do you have difficulty concentrating, remembering or making decisions? No           Age-appropriate Screening Schedule:  Refer to the list below for future screening recommendations based on patient's age, sex and/or medical  conditions. Orders for these recommended tests are listed in the plan section. The patient has been provided with a written plan.    Health Maintenance List  Health Maintenance   Topic Date Due    ANNUAL WELLNESS VISIT  09/07/2024    LIPID PANEL  09/07/2024    INFLUENZA VACCINE  08/01/2024    BMI FOLLOWUP  08/13/2025    TDAP/TD VACCINES (2 - Td or Tdap) 03/02/2027    COLORECTAL CANCER SCREENING  10/26/2032    HEPATITIS C SCREENING  Completed    Pneumococcal Vaccine 65+  Completed    AAA SCREEN (ONE-TIME)  Completed    ZOSTER VACCINE  Completed    COVID-19 Vaccine  Discontinued                                                                                                                                                CMS Preventative Services Quick Reference  Risk Factors Identified During Encounter  Immunizations Discussed/Encouraged: Influenza and RSV (Respiratory Syncytial Virus)    The above risks/problems have been discussed with the patient.  Pertinent information has been shared with the patient in the After Visit Summary.  An After Visit Summary and PPPS were made available to the patient.    Follow Up:   Next Medicare Wellness visit to be scheduled in 1 year.     Assessment & Plan  Encounter for subsequent annual wellness visit (AWV) in Medicare patient    Impaired fasting glucose    Hyperlipidemia     History of 2019 novel coronavirus disease (COVID-19)    Benign essential hypertension    Benign prostatic hyperplasia without lower urinary tract symptoms    Rising PSA level    Family history of colon cancer    History of colon polyps, 10/26/2022--hyperplastic x1.  Endoscopist recommends 10-year surveillance.    Multiple actinic keratoses    Overweight (BMI 25.0-29.9)  Patient's (Body mass index is 27.26 kg/m².) indicates that they are overweight with health conditions that include hypertension, impaired fasting glucose, and dyslipidemias . Weight is improving with treatment. BMI is above average; BMI  management plan is completed. We discussed low calorie, low carb based diet program, portion control, and increasing exercise.   Statin intolerance    Myalgia, secondary Lipitor    Vitamin D deficiency    Therapeutic drug monitoring    Orders Placed This Encounter   Procedures    SARS-CoV-2 Antibodies, Nucleocapsid (Natural Immunity)    CBC (No Diff)    Comprehensive Metabolic Panel    Hemoglobin A1c    NMR LipoProfile    TSH    T3, Free    PSA DIAGNOSTIC    Urinalysis With Microscopic If Indicated (No Culture) - Urine, Clean Catch    Vitamin D,25-Hydroxy    T4, Free             Follow Up:   No follow-ups on file.

## 2024-09-10 NOTE — ASSESSMENT & PLAN NOTE
Patient's (Body mass index is 27.26 kg/m².) indicates that they are overweight with health conditions that include hypertension, impaired fasting glucose, and dyslipidemias . Weight is improving with treatment. BMI is above average; BMI management plan is completed. We discussed low calorie, low carb based diet program, portion control, and increasing exercise.

## 2024-09-11 LAB
CHOLEST SERPL-MCNC: 145 MG/DL (ref 100–199)
HDL SERPL-SCNC: 31.2 UMOL/L
HDLC SERPL-MCNC: 49 MG/DL
LDL SERPL QN: 21.4 NM
LDL SERPL-SCNC: 1152 NMOL/L
LDL SMALL SERPL-SCNC: 542 NMOL/L
LDLC SERPL CALC-MCNC: 83 MG/DL (ref 0–99)
TRIGL SERPL-MCNC: 66 MG/DL (ref 0–149)

## 2024-09-13 LAB — SARS-COV-2 AB SERPL QL IA: POSITIVE

## 2024-09-17 ENCOUNTER — OFFICE VISIT (OUTPATIENT)
Dept: INTERNAL MEDICINE | Facility: CLINIC | Age: 69
End: 2024-09-17
Payer: MEDICARE

## 2024-09-17 VITALS
SYSTOLIC BLOOD PRESSURE: 130 MMHG | DIASTOLIC BLOOD PRESSURE: 78 MMHG | RESPIRATION RATE: 16 BRPM | OXYGEN SATURATION: 97 % | WEIGHT: 189 LBS | BODY MASS INDEX: 27.06 KG/M2 | TEMPERATURE: 97.4 F | HEIGHT: 70 IN | HEART RATE: 61 BPM

## 2024-09-17 DIAGNOSIS — I10 BENIGN ESSENTIAL HYPERTENSION: Chronic | ICD-10-CM

## 2024-09-17 DIAGNOSIS — E66.3 OVERWEIGHT (BMI 25.0-29.9): Chronic | ICD-10-CM

## 2024-09-17 DIAGNOSIS — Z86.010 HISTORY OF COLON POLYPS: Chronic | ICD-10-CM

## 2024-09-17 DIAGNOSIS — Z86.16 HISTORY OF 2019 NOVEL CORONAVIRUS DISEASE (COVID-19): Chronic | ICD-10-CM

## 2024-09-17 DIAGNOSIS — M79.10 MYALGIA: Chronic | ICD-10-CM

## 2024-09-17 DIAGNOSIS — R97.20 RISING PSA LEVEL: ICD-10-CM

## 2024-09-17 DIAGNOSIS — L57.0 MULTIPLE ACTINIC KERATOSES: Chronic | ICD-10-CM

## 2024-09-17 DIAGNOSIS — N40.0 BENIGN PROSTATIC HYPERPLASIA WITHOUT LOWER URINARY TRACT SYMPTOMS: Chronic | ICD-10-CM

## 2024-09-17 DIAGNOSIS — E78.2 MIXED HYPERLIPIDEMIA: Chronic | ICD-10-CM

## 2024-09-17 DIAGNOSIS — Z51.81 THERAPEUTIC DRUG MONITORING: ICD-10-CM

## 2024-09-17 DIAGNOSIS — R73.01 IMPAIRED FASTING GLUCOSE: Primary | Chronic | ICD-10-CM

## 2024-09-17 DIAGNOSIS — E55.9 VITAMIN D DEFICIENCY: Chronic | ICD-10-CM

## 2024-09-17 DIAGNOSIS — Z80.0 FAMILY HISTORY OF COLON CANCER: Chronic | ICD-10-CM

## 2024-09-17 DIAGNOSIS — Z78.9 STATIN INTOLERANCE: Chronic | ICD-10-CM

## 2024-09-17 DIAGNOSIS — I10 WHITE COAT SYNDROME WITH DIAGNOSIS OF HYPERTENSION: Chronic | ICD-10-CM

## 2024-09-17 PROCEDURE — 1159F MED LIST DOCD IN RCRD: CPT | Performed by: INTERNAL MEDICINE

## 2024-09-17 PROCEDURE — 1160F RVW MEDS BY RX/DR IN RCRD: CPT | Performed by: INTERNAL MEDICINE

## 2024-09-17 PROCEDURE — 3078F DIAST BP <80 MM HG: CPT | Performed by: INTERNAL MEDICINE

## 2024-09-17 PROCEDURE — 1125F AMNT PAIN NOTED PAIN PRSNT: CPT | Performed by: INTERNAL MEDICINE

## 2024-09-17 PROCEDURE — 3075F SYST BP GE 130 - 139MM HG: CPT | Performed by: INTERNAL MEDICINE

## 2024-09-17 PROCEDURE — 99214 OFFICE O/P EST MOD 30 MIN: CPT | Performed by: INTERNAL MEDICINE

## 2024-09-30 RX ORDER — METOPROLOL SUCCINATE 25 MG/1
TABLET, EXTENDED RELEASE ORAL
Qty: 90 TABLET | Refills: 3 | Status: SHIPPED | OUTPATIENT
Start: 2024-09-30

## 2024-11-06 RX ORDER — OLMESARTAN MEDOXOMIL AND HYDROCHLOROTHIAZIDE 40/12.5 40; 12.5 MG/1; MG/1
TABLET ORAL
Qty: 90 TABLET | Refills: 3 | Status: SHIPPED | OUTPATIENT
Start: 2024-11-06

## 2025-08-18 RX ORDER — EZETIMIBE 10 MG/1
10 TABLET ORAL EVERY EVENING
Qty: 90 TABLET | Refills: 3 | Status: SHIPPED | OUTPATIENT
Start: 2025-08-18

## (undated) DEVICE — LN SMPL CO2 SHTRM SD STREAM W/M LUER

## (undated) DEVICE — SYR LL TP 10ML STRL

## (undated) DEVICE — NDL HYPO PRECISIONGLIDE/REG 18G 11/2 PNK

## (undated) DEVICE — SENSR O2 OXIMAX FNGR A/ 18IN NONSTR

## (undated) DEVICE — CANN O2 ETCO2 FITS ALL CONN CO2 SMPL A/ 7IN DISP LF

## (undated) DEVICE — ADAPT CLN BIOGUARD AIR/H2O DISP

## (undated) DEVICE — SINGLE-USE BIOPSY FORCEPS: Brand: RADIAL JAW 4

## (undated) DEVICE — TUBING, SUCTION, 1/4" X 10', STRAIGHT: Brand: MEDLINE

## (undated) DEVICE — ARM SLING: Brand: DEROYAL

## (undated) DEVICE — KT ORCA ORCAPOD DISP STRL